# Patient Record
Sex: MALE | Race: BLACK OR AFRICAN AMERICAN | NOT HISPANIC OR LATINO | Employment: OTHER | URBAN - NONMETROPOLITAN AREA
[De-identification: names, ages, dates, MRNs, and addresses within clinical notes are randomized per-mention and may not be internally consistent; named-entity substitution may affect disease eponyms.]

---

## 2023-11-16 ENCOUNTER — HOSPITAL ENCOUNTER (OUTPATIENT)
Facility: HOSPITAL | Age: 28
Discharge: HOME | End: 2023-11-19
Admitting: FAMILY MEDICINE
Payer: MEDICAID

## 2023-11-16 DIAGNOSIS — K52.9 ENTERITIS: ICD-10-CM

## 2023-11-16 DIAGNOSIS — R10.9 ABDOMINAL PAIN, UNSPECIFIED ABDOMINAL LOCATION: ICD-10-CM

## 2023-11-16 DIAGNOSIS — R10.31 RIGHT LOWER QUADRANT ABDOMINAL PAIN: Primary | ICD-10-CM

## 2023-11-16 LAB
ALBUMIN SERPL-MCNC: 4.9 G/DL (ref 3.4–5)
ALBUMIN/GLOB SERPL: 1.8 RATIO
ALP SERPL-CCNC: 96 UNIT/L (ref 50–144)
ALT SERPL-CCNC: 24 UNIT/L (ref 1–45)
ANION GAP SERPL CALC-SCNC: 10 MEQ/L (ref 2–13)
APPEARANCE UR: CLEAR
AST SERPL-CCNC: 35 UNIT/L (ref 17–59)
BASOPHILS # BLD AUTO: 0.04 X10(3)/MCL (ref 0.01–0.08)
BASOPHILS NFR BLD AUTO: 0.6 % (ref 0.1–1.2)
BILIRUB SERPL-MCNC: 1.1 MG/DL (ref 0–1)
BILIRUB UR QL STRIP.AUTO: NEGATIVE
BUN SERPL-MCNC: 8 MG/DL (ref 7–20)
CALCIUM SERPL-MCNC: 9.4 MG/DL (ref 8.4–10.2)
CHLORIDE SERPL-SCNC: 101 MMOL/L (ref 98–110)
CO2 SERPL-SCNC: 27 MMOL/L (ref 21–32)
COLOR UR AUTO: YELLOW
CREAT SERPL-MCNC: 0.63 MG/DL (ref 0.66–1.25)
CREAT/UREA NIT SERPL: 13 (ref 12–20)
EOSINOPHIL # BLD AUTO: 0.19 X10(3)/MCL (ref 0.04–0.54)
EOSINOPHIL NFR BLD AUTO: 3 % (ref 0.7–7)
ERYTHROCYTE [DISTWIDTH] IN BLOOD BY AUTOMATED COUNT: 13.2 %
GFR SERPLBLD CREATININE-BSD FMLA CKD-EPI: >90 MLS/MIN/1.73/M2
GLOBULIN SER-MCNC: 2.7 GM/DL (ref 2–3.9)
GLUCOSE SERPL-MCNC: 114 MG/DL (ref 70–115)
GLUCOSE UR QL STRIP.AUTO: NEGATIVE
HCT VFR BLD AUTO: 44.2 % (ref 36–52)
HGB BLD-MCNC: 15.1 G/DL (ref 13–18)
IMM GRANULOCYTES # BLD AUTO: 0.03 X10(3)/MCL (ref 0–0.03)
IMM GRANULOCYTES NFR BLD AUTO: 0.5 % (ref 0–0.5)
KETONES UR QL STRIP.AUTO: NEGATIVE
LEUKOCYTE ESTERASE UR QL STRIP.AUTO: NEGATIVE
LIPASE SERPL-CCNC: 60 U/L (ref 23–300)
LYMPHOCYTES # BLD AUTO: 2.11 X10(3)/MCL (ref 1.32–3.57)
LYMPHOCYTES NFR BLD AUTO: 33.4 % (ref 20–55)
MCH RBC QN AUTO: 28 PG (ref 27–34)
MCHC RBC AUTO-ENTMCNC: 34.2 G/DL (ref 31–37)
MCV RBC AUTO: 81.9 FL (ref 79–99)
MONOCYTES # BLD AUTO: 0.43 X10(3)/MCL (ref 0.3–0.82)
MONOCYTES NFR BLD AUTO: 6.8 % (ref 4.7–12.5)
NEUTROPHILS # BLD AUTO: 3.51 X10(3)/MCL (ref 1.78–5.38)
NEUTROPHILS NFR BLD AUTO: 55.7 % (ref 37–73)
NITRITE UR QL STRIP.AUTO: NEGATIVE
NRBC BLD AUTO-RTO: 0 %
PH UR STRIP.AUTO: 6 [PH]
PLATELET # BLD AUTO: 315 X10(3)/MCL (ref 140–371)
PMV BLD AUTO: 8.8 FL (ref 9.4–12.4)
POTASSIUM SERPL-SCNC: 3.6 MMOL/L (ref 3.5–5.1)
PROT SERPL-MCNC: 7.6 GM/DL (ref 6.3–8.2)
PROT UR QL STRIP.AUTO: NEGATIVE
RBC # BLD AUTO: 5.4 X10(6)/MCL (ref 4–6)
RBC UR QL AUTO: NEGATIVE
SODIUM SERPL-SCNC: 138 MMOL/L (ref 135–145)
SP GR UR STRIP.AUTO: <=1.005 (ref 1–1.03)
UROBILINOGEN UR STRIP-ACNC: 0.2
WBC # SPEC AUTO: 6.31 X10(3)/MCL (ref 4–11.5)

## 2023-11-16 PROCEDURE — 25000003 PHARM REV CODE 250

## 2023-11-16 PROCEDURE — 25500020 PHARM REV CODE 255

## 2023-11-16 PROCEDURE — 80053 COMPREHEN METABOLIC PANEL: CPT

## 2023-11-16 PROCEDURE — 81003 URINALYSIS AUTO W/O SCOPE: CPT

## 2023-11-16 PROCEDURE — 99285 EMERGENCY DEPT VISIT HI MDM: CPT | Mod: 25

## 2023-11-16 PROCEDURE — 85025 COMPLETE CBC W/AUTO DIFF WBC: CPT

## 2023-11-16 PROCEDURE — 63600175 PHARM REV CODE 636 W HCPCS

## 2023-11-16 PROCEDURE — G0378 HOSPITAL OBSERVATION PER HR: HCPCS

## 2023-11-16 PROCEDURE — 96375 TX/PRO/DX INJ NEW DRUG ADDON: CPT

## 2023-11-16 PROCEDURE — 83690 ASSAY OF LIPASE: CPT

## 2023-11-16 PROCEDURE — S5010 5% DEXTROSE AND 0.45% SALINE: HCPCS

## 2023-11-16 PROCEDURE — 96365 THER/PROPH/DIAG IV INF INIT: CPT | Mod: 59

## 2023-11-16 PROCEDURE — 96361 HYDRATE IV INFUSION ADD-ON: CPT

## 2023-11-16 RX ORDER — FAMOTIDINE 10 MG/ML
20 INJECTION INTRAVENOUS EVERY 12 HOURS
Status: DISCONTINUED | OUTPATIENT
Start: 2023-11-16 | End: 2023-11-19 | Stop reason: HOSPADM

## 2023-11-16 RX ORDER — CITALOPRAM 20 MG/1
20 TABLET, FILM COATED ORAL DAILY
Status: DISCONTINUED | OUTPATIENT
Start: 2023-11-17 | End: 2023-11-19 | Stop reason: HOSPADM

## 2023-11-16 RX ORDER — SODIUM CHLORIDE 0.9 % (FLUSH) 0.9 %
10 SYRINGE (ML) INJECTION
Status: DISCONTINUED | OUTPATIENT
Start: 2023-11-16 | End: 2023-11-19 | Stop reason: HOSPADM

## 2023-11-16 RX ORDER — DEXTROAMPHETAMINE SACCHARATE, AMPHETAMINE ASPARTATE, DEXTROAMPHETAMINE SULFATE, AND AMPHETAMINE SULFATE 7.5; 7.5; 7.5; 7.5 MG/1; MG/1; MG/1; MG/1
1 TABLET ORAL 2 TIMES DAILY
COMMUNITY
Start: 2023-10-31

## 2023-11-16 RX ORDER — KETOROLAC TROMETHAMINE 30 MG/ML
15 INJECTION, SOLUTION INTRAMUSCULAR; INTRAVENOUS EVERY 6 HOURS PRN
Status: DISCONTINUED | OUTPATIENT
Start: 2023-11-16 | End: 2023-11-19 | Stop reason: HOSPADM

## 2023-11-16 RX ORDER — KETOROLAC TROMETHAMINE 30 MG/ML
15 INJECTION, SOLUTION INTRAMUSCULAR; INTRAVENOUS
Status: COMPLETED | OUTPATIENT
Start: 2023-11-16 | End: 2023-11-16

## 2023-11-16 RX ORDER — DEXTROSE MONOHYDRATE AND SODIUM CHLORIDE 5; .45 G/100ML; G/100ML
INJECTION, SOLUTION INTRAVENOUS CONTINUOUS
Status: DISCONTINUED | OUTPATIENT
Start: 2023-11-16 | End: 2023-11-19 | Stop reason: HOSPADM

## 2023-11-16 RX ORDER — CITALOPRAM HYDROBROMIDE 20 MG
20 TABLET ORAL
COMMUNITY
Start: 2023-10-31

## 2023-11-16 RX ORDER — TALC
6 POWDER (GRAM) TOPICAL NIGHTLY PRN
Status: DISCONTINUED | OUTPATIENT
Start: 2023-11-16 | End: 2023-11-19 | Stop reason: HOSPADM

## 2023-11-16 RX ORDER — ONDANSETRON 2 MG/ML
4 INJECTION INTRAMUSCULAR; INTRAVENOUS
Status: COMPLETED | OUTPATIENT
Start: 2023-11-16 | End: 2023-11-16

## 2023-11-16 RX ADMIN — ONDANSETRON 4 MG: 2 INJECTION INTRAMUSCULAR; INTRAVENOUS at 06:11

## 2023-11-16 RX ADMIN — IOPAMIDOL 100 ML: 755 INJECTION, SOLUTION INTRAVENOUS at 06:11

## 2023-11-16 RX ADMIN — FAMOTIDINE 20 MG: 10 INJECTION, SOLUTION INTRAVENOUS at 10:11

## 2023-11-16 RX ADMIN — PIPERACILLIN AND TAZOBACTAM 4.5 G: 4; .5 INJECTION, POWDER, LYOPHILIZED, FOR SOLUTION INTRAVENOUS; PARENTERAL at 08:11

## 2023-11-16 RX ADMIN — KETOROLAC TROMETHAMINE 15 MG: 30 INJECTION, SOLUTION INTRAMUSCULAR; INTRAVENOUS at 06:11

## 2023-11-16 RX ADMIN — DEXTROSE AND SODIUM CHLORIDE: 5; 450 INJECTION, SOLUTION INTRAVENOUS at 10:11

## 2023-11-16 RX ADMIN — MELATONIN TAB 3 MG 6 MG: 3 TAB at 11:11

## 2023-11-17 LAB
ALBUMIN SERPL-MCNC: 4.2 G/DL (ref 3.4–5)
ALBUMIN/GLOB SERPL: 1.9 RATIO
ALP SERPL-CCNC: 83 UNIT/L (ref 50–144)
ALT SERPL-CCNC: 20 UNIT/L (ref 1–45)
ANION GAP SERPL CALC-SCNC: 8 MEQ/L (ref 2–13)
AST SERPL-CCNC: 30 UNIT/L (ref 17–59)
BASOPHILS # BLD AUTO: 0.06 X10(3)/MCL (ref 0.01–0.08)
BASOPHILS NFR BLD AUTO: 0.8 % (ref 0.1–1.2)
BILIRUB SERPL-MCNC: 1 MG/DL (ref 0–1)
BUN SERPL-MCNC: 7 MG/DL (ref 7–20)
CALCIUM SERPL-MCNC: 9.1 MG/DL (ref 8.4–10.2)
CHLORIDE SERPL-SCNC: 102 MMOL/L (ref 98–110)
CO2 SERPL-SCNC: 27 MMOL/L (ref 21–32)
CREAT SERPL-MCNC: 0.67 MG/DL (ref 0.66–1.25)
CREAT/UREA NIT SERPL: 10 (ref 12–20)
EOSINOPHIL # BLD AUTO: 0.31 X10(3)/MCL (ref 0.04–0.54)
EOSINOPHIL NFR BLD AUTO: 4 % (ref 0.7–7)
ERYTHROCYTE [DISTWIDTH] IN BLOOD BY AUTOMATED COUNT: 13.2 %
GFR SERPLBLD CREATININE-BSD FMLA CKD-EPI: >90 MLS/MIN/1.73/M2
GLOBULIN SER-MCNC: 2.2 GM/DL (ref 2–3.9)
GLUCOSE SERPL-MCNC: 93 MG/DL (ref 70–115)
HCT VFR BLD AUTO: 40 % (ref 36–52)
HGB BLD-MCNC: 13.8 G/DL (ref 13–18)
IMM GRANULOCYTES # BLD AUTO: 0.02 X10(3)/MCL (ref 0–0.03)
IMM GRANULOCYTES NFR BLD AUTO: 0.3 % (ref 0–0.5)
LYMPHOCYTES # BLD AUTO: 3.2 X10(3)/MCL (ref 1.32–3.57)
LYMPHOCYTES NFR BLD AUTO: 41.3 % (ref 20–55)
MCH RBC QN AUTO: 28.5 PG (ref 27–34)
MCHC RBC AUTO-ENTMCNC: 34.5 G/DL (ref 31–37)
MCV RBC AUTO: 82.6 FL (ref 79–99)
MONOCYTES # BLD AUTO: 0.5 X10(3)/MCL (ref 0.3–0.82)
MONOCYTES NFR BLD AUTO: 6.5 % (ref 4.7–12.5)
NEUTROPHILS # BLD AUTO: 3.65 X10(3)/MCL (ref 1.78–5.38)
NEUTROPHILS NFR BLD AUTO: 47.1 % (ref 37–73)
NRBC BLD AUTO-RTO: 0 %
PLATELET # BLD AUTO: 306 X10(3)/MCL (ref 140–371)
PMV BLD AUTO: 8.9 FL (ref 9.4–12.4)
POTASSIUM SERPL-SCNC: 4.1 MMOL/L (ref 3.5–5.1)
PROT SERPL-MCNC: 6.4 GM/DL (ref 6.3–8.2)
RBC # BLD AUTO: 4.84 X10(6)/MCL (ref 4–6)
SODIUM SERPL-SCNC: 137 MMOL/L (ref 135–145)
WBC # SPEC AUTO: 7.74 X10(3)/MCL (ref 4–11.5)

## 2023-11-17 PROCEDURE — 96376 TX/PRO/DX INJ SAME DRUG ADON: CPT | Mod: 59

## 2023-11-17 PROCEDURE — S5010 5% DEXTROSE AND 0.45% SALINE: HCPCS

## 2023-11-17 PROCEDURE — 36415 COLL VENOUS BLD VENIPUNCTURE: CPT

## 2023-11-17 PROCEDURE — 85025 COMPLETE CBC W/AUTO DIFF WBC: CPT

## 2023-11-17 PROCEDURE — 96366 THER/PROPH/DIAG IV INF ADDON: CPT

## 2023-11-17 PROCEDURE — 63600175 PHARM REV CODE 636 W HCPCS: Performed by: FAMILY MEDICINE

## 2023-11-17 PROCEDURE — 63600175 PHARM REV CODE 636 W HCPCS

## 2023-11-17 PROCEDURE — 96361 HYDRATE IV INFUSION ADD-ON: CPT

## 2023-11-17 PROCEDURE — G0378 HOSPITAL OBSERVATION PER HR: HCPCS

## 2023-11-17 PROCEDURE — 25000003 PHARM REV CODE 250

## 2023-11-17 PROCEDURE — 94761 N-INVAS EAR/PLS OXIMETRY MLT: CPT

## 2023-11-17 PROCEDURE — 80053 COMPREHEN METABOLIC PANEL: CPT | Performed by: FAMILY MEDICINE

## 2023-11-17 RX ORDER — SINCALIDE 5 UG/5ML
0.02 INJECTION, POWDER, LYOPHILIZED, FOR SOLUTION INTRAVENOUS ONCE
Status: COMPLETED | OUTPATIENT
Start: 2023-11-17 | End: 2023-11-17

## 2023-11-17 RX ORDER — DEXTROAMPHETAMINE SACCHARATE, AMPHETAMINE ASPARTATE, DEXTROAMPHETAMINE SULFATE AND AMPHETAMINE SULFATE 7.5; 7.5; 7.5; 7.5 MG/1; MG/1; MG/1; MG/1
1 TABLET ORAL 2 TIMES DAILY
Status: DISCONTINUED | OUTPATIENT
Start: 2023-11-17 | End: 2023-11-19 | Stop reason: HOSPADM

## 2023-11-17 RX ORDER — DEXTROAMPHETAMINE SACCHARATE, AMPHETAMINE ASPARTATE, DEXTROAMPHETAMINE SULFATE AND AMPHETAMINE SULFATE 7.5; 7.5; 7.5; 7.5 MG/1; MG/1; MG/1; MG/1
1 TABLET ORAL 2 TIMES DAILY
Status: DISCONTINUED | OUTPATIENT
Start: 2023-11-17 | End: 2023-11-17

## 2023-11-17 RX ADMIN — SINCALIDE 1.3 MCG: 5 INJECTION, POWDER, LYOPHILIZED, FOR SOLUTION INTRAVENOUS at 02:11

## 2023-11-17 RX ADMIN — PIPERACILLIN AND TAZOBACTAM 4.5 G: 4; .5 INJECTION, POWDER, LYOPHILIZED, FOR SOLUTION INTRAVENOUS; PARENTERAL at 09:11

## 2023-11-17 RX ADMIN — PIPERACILLIN AND TAZOBACTAM 4.5 G: 4; .5 INJECTION, POWDER, LYOPHILIZED, FOR SOLUTION INTRAVENOUS; PARENTERAL at 03:11

## 2023-11-17 RX ADMIN — CITALOPRAM HYDROBROMIDE 20 MG: 20 TABLET ORAL at 09:11

## 2023-11-17 RX ADMIN — DEXTROSE AND SODIUM CHLORIDE: 5; 450 INJECTION, SOLUTION INTRAVENOUS at 06:11

## 2023-11-17 RX ADMIN — FAMOTIDINE 20 MG: 10 INJECTION, SOLUTION INTRAVENOUS at 09:11

## 2023-11-17 RX ADMIN — DEXTROAMPHETAMINE SACCHARATE, AMPHETAMINE ASPARTATE, DEXTROAMPHETAMINE SULFATE AND AMPHETAMINE SULFATE 30 MG: 7.5; 7.5; 7.5; 7.5 TABLET ORAL at 09:11

## 2023-11-17 RX ADMIN — DEXTROSE AND SODIUM CHLORIDE: 5; 450 INJECTION, SOLUTION INTRAVENOUS at 11:11

## 2023-11-17 RX ADMIN — PIPERACILLIN AND TAZOBACTAM 4.5 G: 4; .5 INJECTION, POWDER, LYOPHILIZED, FOR SOLUTION INTRAVENOUS; PARENTERAL at 11:11

## 2023-11-17 RX ADMIN — DEXTROSE AND SODIUM CHLORIDE: 5; 450 INJECTION, SOLUTION INTRAVENOUS at 03:11

## 2023-11-17 RX ADMIN — KETOROLAC TROMETHAMINE 15 MG: 30 INJECTION, SOLUTION INTRAMUSCULAR; INTRAVENOUS at 06:11

## 2023-11-17 NOTE — PLAN OF CARE
11/17/23 0945   Discharge Assessment   Assessment Type Discharge Planning Assessment   Confirmed/corrected address, phone number and insurance Yes   Confirmed Demographics Correct on Facesheet   Source of Information patient   When was your last doctors appointment? 10/04/23   Communicated DELORES with patient/caregiver Date not available/Unable to determine   Reason For Admission Enteritis   People in Home parent(s);child(ace), dependent   Facility Arrived From: Home   Do you expect to return to your current living situation? Yes   Prior to hospitilization cognitive status: Alert/Oriented   Current cognitive status: Alert/Oriented   Equipment Currently Used at Home none   Readmission within 30 days? No   Do you currently have service(s) that help you manage your care at home? No   Do you take prescription medications? Yes   Do you have prescription coverage? Yes   [place]age [place]   Do you have any problems affording any of your prescribed medications? No   Is the patient taking medications as prescribed? yes   Who is going to help you get home at discharge? Parent   How do you get to doctors appointments? family or friend will provide   Are you on dialysis? No   Do you take coumadin? No   DME Needed Upon Discharge  none   Discharge Plan discussed with: Parent(s)   Name(s) and Number(s) Dony Ling-Father  [phone]   Transition of Care Barriers None   Discharge Plan A Home with family   Physical Activity   On average, how many days per week do you engage in moderate to strenuous exercise (like a brisk walk)? 1 day   On average, how many minutes do you engage in exercise at this level? 10 min   Financial Resource Strain   How hard is it for you to pay for the very basics like food, housing, medical care, and heating? Not hard   Housing Stability   In the last 12 months, was there a time when you were not able to pay the mortgage or rent on time? N   In the last 12 months, how many places have you lived? 1   In the  last 12 months, was there a time when you did not have a steady place to sleep or slept in a shelter (including now)? N   Transportation Needs   In the past 12 months, has lack of transportation kept you from medical appointments or from getting medications? no   In the past 12 months, has lack of transportation kept you from meetings, work, or from getting things needed for daily living? No   Food Insecurity   Within the past 12 months, you worried that your food would run out before you got the money to buy more. Never true   Within the past 12 months, the food you bought just didn't last and you didn't have money to get more. Never true   Stress   Do you feel stress - tense, restless, nervous, or anxious, or unable to sleep at night because your mind is troubled all the time - these days? To some exte   Social Connections   In a typical week, how many times do you talk on the phone with family, friends, or neighbors? More than 3   How often do you get together with friends or relatives? More than 3   How often do you attend Rastafari or Restorationist services? 1 to 4   Do you belong to any clubs or organizations such as Rastafari groups, unions, fraternal or athletic groups, or school groups? No   How often do you attend meetings of the clubs or organizations you belong to? Never   Are you , , , , never , or living with a partner? Never marrie   Alcohol Use   Q1: How often do you have a drink containing alcohol? Never   Q2: How many drinks containing alcohol do you have on a typical day when you are drinking? None   Q3: How often do you have six or more drinks on one occasion? Never   OTHER   Name(s) of People in Home Dony Ling, Ibeth Gee, and 16 yr sister

## 2023-11-17 NOTE — PROGRESS NOTES
[facility]/Surg  [facility] Medicine  Progress Note    Patient Name: Manny Ling  MRN: [id number]  Patient Class: OP- Observation   Admission Date: 11/16/2023  Length of Stay: 0 days  Attending Physician: Jay Howard MD  Primary Care Provider: No primary care provider on file.        Subjective:     Principal Problem:Right lower quadrant abdominal pain        HPI:   Abdominal Pain       RLQ abd/periumbilical pain. That started today. With nausea. Last BM today PTA. reported to be normal. Denies Fever, Dysuria.          HPI: The patient is a 28-year-old man with no significant past medical history. He presents with a magalie pain present for the last one to two days. Very little history is available from the patient due to history of autism. The patient's cat scan performed in the emergency department is significant for right lower quadrant inflammatory process possibly. The appendix is not well visualized soul appendicitis remains on the differential. General surgery was consult it for evaluation and they will see the patient in the morning. Currently he is not complaining of any abdominal pain and his exam is not remarkable.     No past medical history on file.     No past surgical history on file.     Review of patient's allergies indicates:  No Known Allergies     No current facility-administered medications on file prior to encounter.       Overview/Hospital Course:  11/17/2023 pt 29 yo with h/o autism presented with RLQ and belem-umbilical pain.  Surgery consulted.      Interval History:      Review of Systems   Unable to perform ROS: Other   Constitutional:  Negative for appetite change, fatigue and fever.   Respiratory:  Negative for cough, shortness of breath and wheezing.    Cardiovascular:  Negative for chest pain and leg swelling.   Gastrointestinal:  Negative for abdominal distention, abdominal pain, constipation, diarrhea, nausea and vomiting.   Skin:  Negative for color change,  pallor, rash and wound.     Objective:     Vital Signs (Most Recent):  Temp: 98 °F (36.7 °C) (11/17/23 1029)  Pulse: 64 (11/17/23 1029)  Resp: 16 (11/17/23 1029)  BP: 136/89 (11/17/23 1029)  SpO2: 95 % (11/17/23 1029) Vital Signs (24h Range):  Temp:  [97.5 °F (36.4 °C)-98.6 °F (37 °C)] 98 °F (36.7 °C)  Pulse:  [60-91] 64  Resp:  [14-20] 16  SpO2:  [95 %-99 %] 95 %  BP: (128-141)/(82-98) 136/89     Weight: 66.3 kg (146 lb 2.6 oz)  Body mass index is 22.22 kg/m².    Intake/Output Summary (Last 24 hours) at 11/17/2023 1322  Last data filed at 11/17/2023 0623  Gross per 24 hour   Intake 1063.33 ml   Output --   Net 1063.33 ml         Physical Exam  Constitutional:       General: He is not in acute distress.     Appearance: Normal appearance. He is normal weight. He is not ill-appearing.   HENT:      Head: Normocephalic and atraumatic.      Nose: Nose normal.   Eyes:      General: No scleral icterus.     Conjunctiva/sclera: Conjunctivae normal.   Cardiovascular:      Rate and Rhythm: Normal rate and regular rhythm.      Pulses: Normal pulses.      Heart sounds: Normal heart sounds.   Pulmonary:      Effort: Pulmonary effort is normal.      Breath sounds: Normal breath sounds.   Abdominal:      General: Abdomen is flat. Bowel sounds are normal.      Palpations: Abdomen is soft.      Tenderness: There is abdominal tenderness (belem umbilical and RLQ mild tenderenss).   Skin:     General: Skin is warm and dry.      Findings: No erythema or rash.   Neurological:      General: No focal deficit present.      Mental Status: He is alert and oriented to person, place, and time.   Psychiatric:         Mood and Affect: Mood normal.         Behavior: Behavior normal.         Thought Content: Thought content normal.             Significant Labs: All pertinent labs within the past 24 hours have been reviewed.  BMP:   Recent Labs   Lab 11/17/23  0436      K 4.1   CO2 27   BUN 7.0   CREATININE 0.67   CALCIUM 9.1     CBC:   Recent  Labs   Lab 11/16/23  1822 11/17/23  0436   WBC 6.31 7.74   HGB 15.1 13.8   HCT 44.2 40.0    306       Significant Imaging: I have reviewed all pertinent imaging results/findings within the past 24 hours.    Assessment/Plan:      * Right lower quadrant abdominal pain  Rule out appendicitis, no clear evidence on CT  To have US this am  Surgery constulted  Will continue iv abx      Enteritis  CT shows area of enteritis small bowel  US pedning  Get HIDA scan  Continue iv abx        VTE Risk Mitigation (From admission, onward)           Ordered     IP VTE LOW RISK PATIENT  Once         11/16/23 2109     Place sequential compression device  Until discontinued         11/16/23 2109                    Discharge Planning   DELORES: 11/18/2023     Code Status: Full Code   Is the patient medically ready for discharge?:     Reason for patient still in hospital (select all that apply): Patient new problem, Laboratory test, Treatment, Imaging, and Consult recommendations  Discharge Plan A: Home with family                  Maria Isabel Agarwal MD  Department of Hospital Medicine   [facility]-Med/Surg

## 2023-11-17 NOTE — H&P (VIEW-ONLY)
"The patient is a 28 yr old man with no significant past medical history He presents with a magalie pain present for the last one to two days.  Very little history is available from the patient due to history of autism.  The patient's CT performed in the ER is significant for right lower quadrant inflammatory process possibly. The appendix is not well visualized soul appendicitis remains on the differential. General Surgery was consult it for evaluation and they will see the patient in the morning. Currently he is not complaining of any abdominal pain.     No known Allergies     Past Medical History   Attention Deficit Disorder  Anxiety   Autistic     Past Surgical History  Tonsillectomy - > 5 yrs - When he was a child  No EGD  No Stress test  No Echocardiogram  No Angiogram   No Colonoscopy      Immunizations  DTAP on 06/22/2001  Hepatitis on 09/30/1998  No Shingles vaccine  Flu on 11/28/2018  No Pneumonia vaccine  No Covid 19 vaccine   Varicella on 07/27/2009    Family History   Mother is alive and working as  @ [employer].  and Divorce   2.   Father is alive and working as a   3.   MGM - DM, CAD, MI X Multiple   4.   MGF - DM, CAD, MI    Social History   Non Smoker  Non Alcohol  No Drugs  No  Service  No senior care time  No Rehab   Not , No Children  Education - 12 th grade - 1 semester college  Disabled -23 yrs.- Autistic  Resides in [place]  PCP is Dr Earl Patel    Review of Systems   Unobtainable 2/2 pt baseline Mental Status     Objective   Vital Signs (11/16/2023)  BP is 134/86  Pulse is 90  Temp is 98.6F(37C)  Resp is 17  Spo2 is 99%  Height is 5' 8" (1.727 M)  Weight is 67.1 kg(148 lb)  Body Mass Index is 22.5kg/m2    Physical Exam  Patient is alert, cooperative,  no distress, appears stated age.  Head is atraumatic  Pupils are round, and equal and reactive to light and is positive for glasses and contacts.   Neck is supple with normal range of motion  Clear to " auscultation bilaterally, respirations unlabored   Regular rate and rhythm, S1 and S2 normal, no murmur, rub or gallop  Abdomen is soft, non tender, bowel sounds active all four quadrants, no masses, or organomegaly.  Extremities normal, atraumatic, no cyanosis or edema   Pulses are 2+ and symmetric   Skin color and texture, turgor normal    Lab Findings  CBC(11/17/2023)  6.31>     15.1/44.2   <315    81.9/28.0   CMP   138/3.6     101/27       8.0 /0.63       <114      9.4/       CT Abdomen and Pelvis With IV Contrast  Impression    1. I suspect a mild apparent inflammatory/infectious process involving the small bowel and mesentery of the pelvis and there is indistinctness of the vas deferens, the prostate with at least mild thickening the urinary bladder wall.  No free air free fluid is identified and the patient is not noted to have an elevated white blood cell count which argues against most of the above with perhaps the exception of an inflammatory bowel disease.  These questionable findings were communicated to the emergency room physician at 19:30.  Recommend clinical correlation       Ultrasound Abdomen Limited  Impression  Not signed, no impression. Will continue to update information        Assessment   The patient is a 28 yr old man with no significant past medical history He presents with a magalie pain present for the last one to two days.  Very little history is available from the patient due to history of autism.  The patient's CT performed in the ER is significant for right lower quadrant inflammatory process possibly. The appendix is not well visualized soul appendicitis remains on the differential. General Surgery was consult it for evaluation and they will see the patient in the morning. Currently he is not complaining of any abdominal pain.       Plan  I.  IV Fluids   2. Check Covid 19  3. Check Ultra Sound  4. Check Hida Scan  5. Recheck CT Scan in AM  6. Check Stool for PCR

## 2023-11-17 NOTE — SUBJECTIVE & OBJECTIVE
Interval History:      Review of Systems   Unable to perform ROS: Other   Constitutional:  Negative for appetite change, fatigue and fever.   Respiratory:  Negative for cough, shortness of breath and wheezing.    Cardiovascular:  Negative for chest pain and leg swelling.   Gastrointestinal:  Negative for abdominal distention, abdominal pain, constipation, diarrhea, nausea and vomiting.   Skin:  Negative for color change, pallor, rash and wound.     Objective:     Vital Signs (Most Recent):  Temp: 98 °F (36.7 °C) (11/17/23 1029)  Pulse: 64 (11/17/23 1029)  Resp: 16 (11/17/23 1029)  BP: 136/89 (11/17/23 1029)  SpO2: 95 % (11/17/23 1029) Vital Signs (24h Range):  Temp:  [97.5 °F (36.4 °C)-98.6 °F (37 °C)] 98 °F (36.7 °C)  Pulse:  [60-91] 64  Resp:  [14-20] 16  SpO2:  [95 %-99 %] 95 %  BP: (128-141)/(82-98) 136/89     Weight: 66.3 kg (146 lb 2.6 oz)  Body mass index is 22.22 kg/m².    Intake/Output Summary (Last 24 hours) at 11/17/2023 1322  Last data filed at 11/17/2023 0623  Gross per 24 hour   Intake 1063.33 ml   Output --   Net 1063.33 ml         Physical Exam  Constitutional:       General: He is not in acute distress.     Appearance: Normal appearance. He is normal weight. He is not ill-appearing.   HENT:      Head: Normocephalic and atraumatic.      Nose: Nose normal.   Eyes:      General: No scleral icterus.     Conjunctiva/sclera: Conjunctivae normal.   Cardiovascular:      Rate and Rhythm: Normal rate and regular rhythm.      Pulses: Normal pulses.      Heart sounds: Normal heart sounds.   Pulmonary:      Effort: Pulmonary effort is normal.      Breath sounds: Normal breath sounds.   Abdominal:      General: Abdomen is flat. Bowel sounds are normal.      Palpations: Abdomen is soft.      Tenderness: There is abdominal tenderness (belem umbilical and RLQ mild tenderenss).   Skin:     General: Skin is warm and dry.      Findings: No erythema or rash.   Neurological:      General: No focal deficit present.       Mental Status: He is alert and oriented to person, place, and time.   Psychiatric:         Mood and Affect: Mood normal.         Behavior: Behavior normal.         Thought Content: Thought content normal.             Significant Labs: All pertinent labs within the past 24 hours have been reviewed.  BMP:   Recent Labs   Lab 11/17/23  0436      K 4.1   CO2 27   BUN 7.0   CREATININE 0.67   CALCIUM 9.1     CBC:   Recent Labs   Lab 11/16/23  1822 11/17/23  0436   WBC 6.31 7.74   HGB 15.1 13.8   HCT 44.2 40.0    306       Significant Imaging: I have reviewed all pertinent imaging results/findings within the past 24 hours.

## 2023-11-17 NOTE — ED PROVIDER NOTES
Encounter Date: 11/16/2023       History     Chief Complaint   Patient presents with    Abdominal Pain     RLQ abd/periumbilical pain. That started today. With nausea. Last BM today PTA. reported to be normal. Denies Fever, Dysuria.      28-year-old male presents complaining of waxing-waning abdominal pain, that began today.  The pain is located to the right and slightly above the umbilicus.  He had an episode of severe pain with a diaphoresis earlier.  The pain is not so bad now.  He denies any constipation.  He denies any urinary problems.  He does not have a history of stomach problems.  He has autism, and is accompanied by his father.    The history is provided by the patient and a parent.     Review of patient's allergies indicates:  No Known Allergies  No past medical history on file.  No past surgical history on file.  No family history on file.     Review of Systems   Constitutional:  Negative for fever.   HENT:  Negative for sore throat.    Respiratory:  Negative for shortness of breath.    Cardiovascular:  Negative for chest pain.   Gastrointestinal:  Positive for abdominal pain and nausea. Negative for constipation, diarrhea and vomiting.   Genitourinary:  Negative for dysuria.   Musculoskeletal:  Negative for back pain.   Skin:  Negative for rash.   Neurological:  Negative for weakness.   Hematological:  Does not bruise/bleed easily.   All other systems reviewed and are negative.      Physical Exam     Initial Vitals [11/16/23 1806]   BP Pulse Resp Temp SpO2   134/86 90 17 98.6 °F (37 °C) 99 %      MAP       --         Physical Exam    Nursing note and vitals reviewed.  Constitutional: Vital signs are normal. He appears well-developed and well-nourished. He is cooperative.   He appears well   HENT:   Head: Normocephalic and atraumatic.   Mouth/Throat: Oropharynx is clear and moist.   Eyes: Conjunctivae, EOM and lids are normal. Pupils are equal, round, and reactive to light.   Neck: Trachea normal. Neck  supple.   Normal range of motion.  Cardiovascular:  Normal rate, regular rhythm, normal heart sounds and intact distal pulses.           Pulmonary/Chest: Breath sounds normal.   Abdominal: Abdomen is soft. Bowel sounds are normal. He exhibits no distension. There is no abdominal tenderness. There is no rebound and no guarding.   Musculoskeletal:         General: Normal range of motion.      Cervical back: Normal, normal range of motion and neck supple.      Lumbar back: Normal.     Neurological: He is alert and oriented to person, place, and time. He has normal strength. Coordination normal. GCS score is 15. GCS eye subscore is 4. GCS verbal subscore is 5. GCS motor subscore is 6.   Skin: Skin is warm, dry and intact. Capillary refill takes less than 2 seconds.   Psychiatric: He has a normal mood and affect. His speech is normal and behavior is normal. Judgment and thought content normal. Cognition and memory are normal.         ED Course   Procedures  Labs Reviewed   COMPREHENSIVE METABOLIC PANEL - Abnormal; Notable for the following components:       Result Value    Creatinine 0.63 (*)     Bilirubin Total 1.1 (*)     All other components within normal limits   CBC WITH DIFFERENTIAL - Abnormal; Notable for the following components:    MPV 8.8 (*)     All other components within normal limits   URINALYSIS, REFLEX TO URINE CULTURE - Normal    Narrative:      URINE STABILITY IS 2 HOURS AT ROOM TEMP OR    SIX HOURS REFRIGERATED. PERFORMING TESTING ON    SPECIMENS GREATER THAN THIS AGE MAY AFFECT THE    FOLLOWING TESTS:    PH          SPECIFIC GRAVITY           BLOOD    CLARITY     BILIRUBIN               UROBILINOGEN   LIPASE - Normal   CBC W/ AUTO DIFFERENTIAL    Narrative:     The following orders were created for panel order CBC auto differential.  Procedure                               Abnormality         Status                     ---------                               -----------         ------                      CBC with Differential[[id number]]       Abnormal            Final result                 Please view results for these tests on the individual orders.          Imaging Results              CT Abdomen Pelvis With IV Contrast (Final result)  Result time 11/16/23 19:33:53      Final result by Robert Funes MD (11/16/23 19:33:53)                   Impression:        1. I suspect a mild apparent inflammatory/infectious process involving the small bowel and mesentery of the pelvis and there is indistinctness of the vas deferens, the prostate with at least mild thickening the urinary bladder wall.  No free air free fluid is identified and the patient is not noted to have an elevated white blood cell count which argues against most of the above with perhaps the exception of an inflammatory bowel disease.  These questionable findings were communicated to the emergency room physician at 19:30.  Recommend clinical correlation.    n/a    CATEGORY: n/a    The following dose reduction techniques are used for all CT at [facility]:    1.   Automated exposure control.    2.   Adjustment of the mA and/or kV according to patient size.    3.   Use of iterative reconstruction technique.      Electronically signed by: Robert Funes  Date:    11/16/2023  Time:    19:33               Narrative:    EXAMINATION:  CT ABDOMEN PELVIS WITH IV CONTRAST    CLINICAL HISTORY:  RLQ abdominal pain (Age >= 14y);    TECHNIQUE:  Low dose axial images, sagittal and coronal reformations were obtained from the lung bases to the pubic symphysis following the IV administration of 100 cc of Isovue 370.    COMPARISON:  None.    FINDINGS:  Liver:  No clinically significant abnormalities noted.    Gallbladder/Biliary System:  No clinically significant abnormalities noted.    Spleen:  No clinically significant abnormalities noted.    Adrenal glands:  No clinically significant abnormalities noted.    Pancreas:  No clinically  significant abnormalities noted.    Kidneys/Urinary Tract:  No clinically significant abnormalities noted.    Urinary bladder:  There is apparent thickening of the urinary bladder wall.    Prostate gland/uterus and ovaries:  The prostate appears to be inflamed.    GI tract: I suspect an inflammatory process is present involving the mesentery of the pelvis and the right lower quadrant with slight indistinctness to the area of the seminal vesicles and the prostate, and the urinary bladder wall appears to be thickened.  A curvilinear small air lucency is seen which could represent at least a portion of the appendix which appears normal however I do not believe that the entirety of the appendix is visible.    Vascular structures:  No clinically significant abnormalities noted.    Musculoskeletal structures:  No clinically significant abnormalities noted.    Miscellaneous:  No clinically significant abnormalities noted.                                       Medications   piperacillin-tazobactam (ZOSYN) 4.5 g in dextrose 5 % in water (D5W) 100 mL IVPB (MB+) (4.5 g Intravenous New Bag 11/16/23 2002)   ondansetron injection 4 mg (4 mg Intravenous Given 11/16/23 1825)   ketorolac injection 15 mg (15 mg Intravenous Given 11/16/23 1827)   iopamidoL (ISOVUE-370) injection 100 mL (100 mLs Intravenous Given 11/16/23 1853)     Medical Decision Making  Right-sided abdominal pain.  Intensity is waxing-waning.  Physical exam is unremarkable.  Differential diagnosis:  Appendicitis, diverticulitis, ureterolithiasis, constipation, obstruction  Toradol, Zofran  CT abdomen, labs    Amount and/or Complexity of Data Reviewed  Labs: ordered. Decision-making details documented in ED Course.  Radiology: ordered.  Discussion of management or test interpretation with external provider(s): I discussed the CT findings with Dr. Chacon, the radiologist at length.  I discussed the case with Dr. Shaw, who recommends keeping him overnight, giving  him antibiotics, and re-evaluating him in the morning.    Risk  Prescription drug management.               ED Course as of 11/16/23 2013   u Nov 16, 2023   1832 CBC auto differential(!)  Normal, no leukocytosis [TM]   1850 Comprehensive metabolic panel(!)  Bilirubin ever so slightly elevated, otherwise normal [TM]   1851 Lipase  Normal [TM]      ED Course User Index  [TM] Davion Han MD                        Clinical Impression:  Final diagnoses:  [R10.31] Right lower quadrant abdominal pain (Primary)  [K52.9] Enteritis          ED Disposition Condition    Observation Stable                Davion Han MD  11/16/23 2014

## 2023-11-17 NOTE — ASSESSMENT & PLAN NOTE
Rule out appendicitis, no clear evidence on CT  To have US this am  Surgery constulted  Will continue iv abx

## 2023-11-17 NOTE — HOSPITAL COURSE
11/17/2023 pt 29 yo with h/o autism presented with RLQ and belem-umbilical pain.  Surgery consulted.    11/18/2023 pain is better today.  CT of abd/pel pending this am  11/19/2023 s/p lap jeaneth doing well post op and ready for dc .  BP was elevated immediately post op is 130/80 this am.  Discussed with pt father will monitor BP at home and bring record of BP to f/u appt with PCP ini 1 week.  Pt will f/u with surgery in 1-2 weeks and continue full liquid diet advance slowly to low cholesterol/low fat per surgery f/u recommendations.

## 2023-11-17 NOTE — CONSULTS
"The patient is a 28 yr old man with no significant past medical history He presents with a magalie pain present for the last one to two days.  Very little history is available from the patient due to history of autism.  The patient's CT performed in the ER is significant for right lower quadrant inflammatory process possibly. The appendix is not well visualized soul appendicitis remains on the differential. General Surgery was consult it for evaluation and they will see the patient in the morning. Currently he is not complaining of any abdominal pain.     No known Allergies     Past Medical History   Attention Deficit Disorder  Anxiety   Autistic     Past Surgical History  Tonsillectomy - > 5 yrs - When he was a child  No EGD  No Stress test  No Echocardiogram  No Angiogram   No Colonoscopy      Immunizations  DTAP on 06/22/2001  Hepatitis on 09/30/1998  No Shingles vaccine  Flu on 11/28/2018  No Pneumonia vaccine  No Covid 19 vaccine   Varicella on 07/27/2009    Family History   Mother is alive and working as  @ [employer].  and Divorce   2.   Father is alive and working as a   3.   MGM - DM, CAD, MI X Multiple   4.   MGF - DM, CAD, MI    Social History   Non Smoker  Non Alcohol  No Drugs  No  Service  No long term time  No Rehab   Not , No Children  Education - 12 th grade - 1 semester college  Disabled -23 yrs.- Autistic  Resides in [place]  PCP is Dr Earl Patel    Review of Systems   Unobtainable 2/2 pt baseline Mental Status     Objective   Vital Signs (11/16/2023)  BP is 134/86  Pulse is 90  Temp is 98.6F(37C)  Resp is 17  Spo2 is 99%  Height is 5' 8" (1.727 M)  Weight is 67.1 kg(148 lb)  Body Mass Index is 22.5kg/m2    Physical Exam  Patient is alert, cooperative,  no distress, appears stated age.  Head is atraumatic  Pupils are round, and equal and reactive to light and is positive for glasses and contacts.   Neck is supple with normal range of motion  Clear to " auscultation bilaterally, respirations unlabored   Regular rate and rhythm, S1 and S2 normal, no murmur, rub or gallop  Abdomen is soft, non tender, bowel sounds active all four quadrants, no masses, or organomegaly.  Extremities normal, atraumatic, no cyanosis or edema   Pulses are 2+ and symmetric   Skin color and texture, turgor normal    Lab Findings  CBC(11/17/2023)  6.31>     15.1/44.2   <315    81.9/28.0   CMP   138/3.6     101/27       8.0 /0.63       <114      9.4/       CT Abdomen and Pelvis With IV Contrast  Impression    1. I suspect a mild apparent inflammatory/infectious process involving the small bowel and mesentery of the pelvis and there is indistinctness of the vas deferens, the prostate with at least mild thickening the urinary bladder wall.  No free air free fluid is identified and the patient is not noted to have an elevated white blood cell count which argues against most of the above with perhaps the exception of an inflammatory bowel disease.  These questionable findings were communicated to the emergency room physician at 19:30.  Recommend clinical correlation       Ultrasound Abdomen Limited  Impression  Not signed, no impression. Will continue to update information        Assessment   The patient is a 28 yr old man with no significant past medical history He presents with a magalie pain present for the last one to two days.  Very little history is available from the patient due to history of autism.  The patient's CT performed in the ER is significant for right lower quadrant inflammatory process possibly. The appendix is not well visualized soul appendicitis remains on the differential. General Surgery was consult it for evaluation and they will see the patient in the morning. Currently he is not complaining of any abdominal pain.       Plan  I.  IV Fluids   2. Check Covid 19  3. Check Ultra Sound  4. Check Hida Scan  5. Recheck CT Scan in AM  6. Check Stool for PCR

## 2023-11-17 NOTE — HPI
Abdominal Pain       RLQ abd/periumbilical pain. That started today. With nausea. Last BM today PTA. reported to be normal. Denies Fever, Dysuria.          HPI: The patient is a 28-year-old man with no significant past medical history. He presents with a magalie pain present for the last one to two days. Very little history is available from the patient due to history of autism. The patient's cat scan performed in the emergency department is significant for right lower quadrant inflammatory process possibly. The appendix is not well visualized soul appendicitis remains on the differential. General surgery was consult it for evaluation and they will see the patient in the morning. Currently he is not complaining of any abdominal pain and his exam is not remarkable.     No past medical history on file.     No past surgical history on file.     Review of patient's allergies indicates:  No Known Allergies     No current facility-administered medications on file prior to encounter.

## 2023-11-17 NOTE — H&P
[facility]    [facility] Physical      Patient Name: Manny Ling  MRN: [id number]  Admission Date: 11/16/2023  Attending Physician: Jay Howard MD   Primary Care Provider: No primary care provider on file.         Patient information was obtained from patient and ER records.     Subjective:     Principal Problem:Right lower quadrant abdominal pain    Chief Complaint: Abdominal pain  Chief Complaint   Patient presents with    Abdominal Pain     RLQ abd/periumbilical pain. That started today. With nausea. Last BM today PTA. reported to be normal. Denies Fever, Dysuria.         HPI: The patient is a 28-year-old man with no significant past medical history. He presents with a magalie pain present for the last one to two days. Very little history is available from the patient due to history of autism. The patient's cat scan performed in the emergency department is significant for right lower quadrant inflammatory process possibly. The appendix is not well visualized soul appendicitis remains on the differential. General surgery was consult it for evaluation and they will see the patient in the morning. Currently he is not complaining of any abdominal pain and his exam is not remarkable.    No past medical history on file.    No past surgical history on file.    Review of patient's allergies indicates:  No Known Allergies    No current facility-administered medications on file prior to encounter.     Current Outpatient Medications on File Prior to Encounter   Medication Sig    ADDERALL 30 mg Tab Take 1 tablet by mouth 2 (two) times daily.    CELEXA 20 mg tablet Take 20 mg by mouth.     Family History    None       Tobacco Use    Smoking status: Not on file    Smokeless tobacco: Not on file   Substance and Sexual Activity    Alcohol use: Not on file    Drug use: Not on file    Sexual activity: Not on file     Review of Systems Unobtainable 2/2 pt baseline mental status  Objective:     Vital  "Signs (Most Recent):  Temp: 98.6 °F (37 °C) (11/16/23 1806)  Pulse: 90 (11/16/23 1806)  Resp: 17 (11/16/23 1806)  BP: 134/86 (11/16/23 1806)  SpO2: 99 % (11/16/23 1806) Vital Signs (24h Range):  Temp:  [98.6 °F (37 °C)] 98.6 °F (37 °C)  Pulse:  [90] 90  Resp:  [17] 17  SpO2:  [99 %] 99 %  BP: (134)/(86) 134/86     Weight: 67.1 kg (148 lb)  Body mass index is 22.5 kg/m².    Physical Exam   /86   Pulse 90   Temp 98.6 °F (37 °C) (Oral)   Resp 17   Ht 5' 8" (1.727 m)   Wt 67.1 kg (148 lb)   SpO2 99%   BMI 22.50 kg/m²     General Appearance:  Alert, cooperative, no distress, appears stated age   Head:  Normocephalic, without obvious abnormality, atraumatic   Eyes:  PERRL, conjunctiva/corneas clear, EOM's intact, fundi benign, both eyes   Ears:  Normal TM's and external ear canals, both ears   Nose: Nares normal, septum midline, mucosa normal, no drainage or sinus tenderness   Throat: Lips, mucosa, and tongue normal; teeth and gums normal   Neck: Supple, symmetrical, trachea midline, no adenopathy, thyroid: not enlarged, symmetric, no tenderness/mass/nodules, no carotid bruit or JVD   Back:   Symmetric, no curvature, ROM normal, no CVA tenderness   Lungs:   Clear to auscultation bilaterally, respirations unlabored   Chest Wall:  No tenderness or deformity   Heart:  Regular rate and rhythm, S1, S2 normal, no murmur, rub or gallop   Abdomen:   Soft, non-tender, bowel sounds active all four quadrants,  no masses, no organomegaly   Genitalia:  Normal male   Rectal:  Normal tone, normal prostate, no masses or tenderness;  guaiac negative stool   Extremities: Extremities normal, atraumatic, no cyanosis or edema   Pulses: 2+ and symmetric   Skin: Skin color, texture, turgor normal, no rashes or lesions   Lymph nodes: Cervical, supraclavicular, and axillary nodes normal   Neurologic: Normal         Significant Labs: All pertinent labs within the past 24 hours have been reviewed.  Recent Lab Results         " 11/16/23  1943   11/16/23  1822        Albumin/Globulin Ratio   1.8       Albumin   4.9       ALP   96       ALT   24       Anion Gap   10.0       Appearance, UA Clear         AST   35       Baso #   0.04       Basophil %   0.6       BILIRUBIN TOTAL   1.1       Bilirubin, UA Negative         BUN   8.0       BUN/CREAT RATIO   13       Calcium   9.4       Chloride   101       CO2   27       Color, UA Yellow         Creatinine   0.63       eGFR   >90  Comment:                      EGFR INTERPRETATION    Beginning 8/15/22 we are reporting the eGFRcr calculation as recommended by the National Kidney Foundation. The eGFRcr equation has similar overall performance characteristics to the older equation, but the values may differ by more than 10% particularly at higher values of eGFRcr and younger adult ages.    NKF stages of chronic kidney disease (CKD)  Stage 1: Kidney damage with normal or increased eGFR (>90 mL/min/1.73 m^2)  Stage 2: Mild reduction in GFR (60-89 mL/min/1.73 m^2)  Stage 3a: Moderate reduction in GFR (45-59 mL/min/1.73 m^2)  Stage 3b: Moderate reduction in GFR (30-44 mL/min/1.73 m^2)  Stage 4: Severe reduction in GFR (15-29 mL/min/1.73 m^2)  Stage 5: Kidney failure (GFR <15 mL/min/1.73 m^2)           Eos #   0.19       Eosinophil %   3.0       Globulin, Total   2.7       Glucose   114       Glucose, UA Negative         Hematocrit   44.2       Hemoglobin   15.1       Immature Grans (Abs)   0.03       Immature Granulocytes   0.5       Ketones, UA Negative         Leukocytes, UA Negative         Lipase   60       Lymph #   2.11       LYMPH %   33.4       MCH   28.0       MCHC   34.2       MCV   81.9       Mono #   0.43       Mono %   6.8       MPV   8.8       Neut #   3.51       Neut %   55.7       NITRITE UA Negative         nRBC   0.0       Occult Blood UA Negative         pH, UA 6.0         Platelet Count   315       Potassium   3.6       PROTEIN TOTAL   7.6       Protein, UA Negative         RBC   5.40        RDW   13.2       Sodium   138       Specific Gravity,UA <=1.005         Urobilinogen, UA 0.2         WBC   6.31               Significant Imaging: I have reviewed all pertinent imaging results/findings within the past 24 hours.  I have reviewed and interpreted all pertinent imaging results/findings within the past 24 hours.    Assessment/Plan:     Active Diagnoses:    Diagnosis Date Noted POA    PRINCIPAL PROBLEM:  Right lower quadrant abdominal pain [R10.31] 11/16/2023 Unknown    Enteritis [K52.9] 11/16/2023 Unknown     - Surgical consultation  - Appendicitis remains in differential  - IV abx Zosyn ongoing  - At this time pt is not c/o pain. I do not want to IV narcotic pain medications to cloud the clinical picture so we will continue with toradol PRN.   - IVF, nausea control ongoing    This encounter was completed via telemedicine (audio/video) w/ nursing at bedside ot assist w/ clinical exam.  SOC Audio/Visual Equipment is using HIPPA Compliant Web Platform. The patient is located in the hospital and the provider is located off site. This patient is placed in observation status under my care.       Participants on Call: Bedside RN, Patient, Physician on Call.     Problems Resolved During this Admission:     VTE Risk Mitigation (From admission, onward)           Ordered     IP VTE LOW RISK PATIENT  Once         11/16/23 2109     Place sequential compression device  Until discontinued         11/16/23 2109                      Josue Lozoya MD  Department of Hospital Medicine   [facility]

## 2023-11-17 NOTE — PLAN OF CARE
Problem: Adult Inpatient Plan of Care  Goal: Plan of Care Review  Outcome: Ongoing, Progressing  Goal: Absence of Hospital-Acquired Illness or Injury  Outcome: Ongoing, Progressing  Goal: Optimal Comfort and Wellbeing  Outcome: Ongoing, Progressing  Goal: Readiness for Transition of Care  Outcome: Ongoing, Progressing     Problem: Pain Acute  Goal: Acceptable Pain Control and Functional Ability  Outcome: Ongoing, Progressing

## 2023-11-18 ENCOUNTER — ANESTHESIA EVENT (OUTPATIENT)
Dept: SURGERY | Facility: HOSPITAL | Age: 28
End: 2023-11-18
Payer: MEDICAID

## 2023-11-18 ENCOUNTER — ANESTHESIA (OUTPATIENT)
Dept: SURGERY | Facility: HOSPITAL | Age: 28
End: 2023-11-18
Payer: MEDICAID

## 2023-11-18 LAB
BASOPHILS # BLD AUTO: 0.04 X10(3)/MCL (ref 0.01–0.08)
BASOPHILS NFR BLD AUTO: 0.7 % (ref 0.1–1.2)
EOSINOPHIL # BLD AUTO: 0.21 X10(3)/MCL (ref 0.04–0.54)
EOSINOPHIL NFR BLD AUTO: 3.7 % (ref 0.7–7)
ERYTHROCYTE [DISTWIDTH] IN BLOOD BY AUTOMATED COUNT: 13.1 %
HCT VFR BLD AUTO: 40.7 % (ref 36–52)
HGB BLD-MCNC: 13.8 G/DL (ref 13–18)
IMM GRANULOCYTES # BLD AUTO: 0.03 X10(3)/MCL (ref 0–0.03)
IMM GRANULOCYTES NFR BLD AUTO: 0.5 % (ref 0–0.5)
LYMPHOCYTES # BLD AUTO: 1.96 X10(3)/MCL (ref 1.32–3.57)
LYMPHOCYTES NFR BLD AUTO: 34.4 % (ref 20–55)
MCH RBC QN AUTO: 28.2 PG (ref 27–34)
MCHC RBC AUTO-ENTMCNC: 33.9 G/DL (ref 31–37)
MCV RBC AUTO: 83.1 FL (ref 79–99)
MONOCYTES # BLD AUTO: 0.42 X10(3)/MCL (ref 0.3–0.82)
MONOCYTES NFR BLD AUTO: 7.4 % (ref 4.7–12.5)
NEUTROPHILS # BLD AUTO: 3.03 X10(3)/MCL (ref 1.78–5.38)
NEUTROPHILS NFR BLD AUTO: 53.3 % (ref 37–73)
NRBC BLD AUTO-RTO: 0 %
PLATELET # BLD AUTO: 291 X10(3)/MCL (ref 140–371)
PMV BLD AUTO: 9 FL (ref 9.4–12.4)
RBC # BLD AUTO: 4.9 X10(6)/MCL (ref 4–6)
WBC # SPEC AUTO: 5.69 X10(3)/MCL (ref 4–11.5)

## 2023-11-18 PROCEDURE — 25000003 PHARM REV CODE 250

## 2023-11-18 PROCEDURE — 25000003 PHARM REV CODE 250: Performed by: SURGERY

## 2023-11-18 PROCEDURE — 36415 COLL VENOUS BLD VENIPUNCTURE: CPT | Performed by: FAMILY MEDICINE

## 2023-11-18 PROCEDURE — 63600175 PHARM REV CODE 636 W HCPCS: Performed by: SURGERY

## 2023-11-18 PROCEDURE — 37000009 HC ANESTHESIA EA ADD 15 MINS: Performed by: SURGERY

## 2023-11-18 PROCEDURE — 71000033 HC RECOVERY, INTIAL HOUR: Performed by: SURGERY

## 2023-11-18 PROCEDURE — 85025 COMPLETE CBC W/AUTO DIFF WBC: CPT | Performed by: FAMILY MEDICINE

## 2023-11-18 PROCEDURE — 96376 TX/PRO/DX INJ SAME DRUG ADON: CPT | Mod: 59

## 2023-11-18 PROCEDURE — 63600175 PHARM REV CODE 636 W HCPCS: Performed by: FAMILY MEDICINE

## 2023-11-18 PROCEDURE — 37000008 HC ANESTHESIA 1ST 15 MINUTES: Performed by: SURGERY

## 2023-11-18 PROCEDURE — 96361 HYDRATE IV INFUSION ADD-ON: CPT | Mod: 59

## 2023-11-18 PROCEDURE — 63600175 PHARM REV CODE 636 W HCPCS

## 2023-11-18 PROCEDURE — 25500020 PHARM REV CODE 255: Performed by: FAMILY MEDICINE

## 2023-11-18 PROCEDURE — D9220A PRA ANESTHESIA: Mod: ,,, | Performed by: NURSE ANESTHETIST, CERTIFIED REGISTERED

## 2023-11-18 PROCEDURE — 94761 N-INVAS EAR/PLS OXIMETRY MLT: CPT

## 2023-11-18 PROCEDURE — D9220A PRA ANESTHESIA: ICD-10-PCS | Mod: ,,, | Performed by: NURSE ANESTHETIST, CERTIFIED REGISTERED

## 2023-11-18 PROCEDURE — 96366 THER/PROPH/DIAG IV INF ADDON: CPT

## 2023-11-18 PROCEDURE — A9698 NON-RAD CONTRAST MATERIALNOC: HCPCS | Performed by: FAMILY MEDICINE

## 2023-11-18 PROCEDURE — 36000709 HC OR TIME LEV III EA ADD 15 MIN: Performed by: SURGERY

## 2023-11-18 PROCEDURE — 25000003 PHARM REV CODE 250: Performed by: NURSE ANESTHETIST, CERTIFIED REGISTERED

## 2023-11-18 PROCEDURE — G0378 HOSPITAL OBSERVATION PER HR: HCPCS

## 2023-11-18 PROCEDURE — 96375 TX/PRO/DX INJ NEW DRUG ADDON: CPT

## 2023-11-18 PROCEDURE — 27201423 OPTIME MED/SURG SUP & DEVICES STERILE SUPPLY: Performed by: SURGERY

## 2023-11-18 PROCEDURE — S5010 5% DEXTROSE AND 0.45% SALINE: HCPCS

## 2023-11-18 PROCEDURE — 36000708 HC OR TIME LEV III 1ST 15 MIN: Performed by: SURGERY

## 2023-11-18 PROCEDURE — 63600175 PHARM REV CODE 636 W HCPCS: Performed by: NURSE ANESTHETIST, CERTIFIED REGISTERED

## 2023-11-18 PROCEDURE — 25500020 PHARM REV CODE 255: Performed by: SURGERY

## 2023-11-18 RX ORDER — MIDAZOLAM HYDROCHLORIDE 1 MG/ML
2 INJECTION INTRAMUSCULAR; INTRAVENOUS
Status: COMPLETED | OUTPATIENT
Start: 2023-11-18 | End: 2023-11-18

## 2023-11-18 RX ORDER — FENTANYL CITRATE 50 UG/ML
INJECTION, SOLUTION INTRAMUSCULAR; INTRAVENOUS
Status: DISCONTINUED | OUTPATIENT
Start: 2023-11-18 | End: 2023-11-18

## 2023-11-18 RX ORDER — HYDRALAZINE HYDROCHLORIDE 20 MG/ML
10 INJECTION INTRAMUSCULAR; INTRAVENOUS EVERY 4 HOURS PRN
Status: DISCONTINUED | OUTPATIENT
Start: 2023-11-18 | End: 2023-11-19 | Stop reason: HOSPADM

## 2023-11-18 RX ORDER — LIDOCAINE HYDROCHLORIDE 10 MG/ML
INJECTION INFILTRATION; PERINEURAL
Status: DISCONTINUED | OUTPATIENT
Start: 2023-11-18 | End: 2023-11-18 | Stop reason: HOSPADM

## 2023-11-18 RX ORDER — BUPIVACAINE HYDROCHLORIDE 5 MG/ML
INJECTION, SOLUTION EPIDURAL; INTRACAUDAL
Status: DISCONTINUED | OUTPATIENT
Start: 2023-11-18 | End: 2023-11-18 | Stop reason: HOSPADM

## 2023-11-18 RX ORDER — FAMOTIDINE 20 MG/1
20 TABLET, FILM COATED ORAL
Status: DISCONTINUED | OUTPATIENT
Start: 2023-11-18 | End: 2023-11-19 | Stop reason: HOSPADM

## 2023-11-18 RX ORDER — LABETALOL HCL 20 MG/4 ML
20 SYRINGE (ML) INTRAVENOUS ONCE
Status: COMPLETED | OUTPATIENT
Start: 2023-11-18 | End: 2023-11-18

## 2023-11-18 RX ORDER — ONDANSETRON 2 MG/ML
INJECTION INTRAMUSCULAR; INTRAVENOUS
Status: DISCONTINUED | OUTPATIENT
Start: 2023-11-18 | End: 2023-11-18

## 2023-11-18 RX ORDER — CLONIDINE HYDROCHLORIDE 0.1 MG/1
0.2 TABLET ORAL EVERY 6 HOURS PRN
Status: DISCONTINUED | OUTPATIENT
Start: 2023-11-18 | End: 2023-11-19 | Stop reason: HOSPADM

## 2023-11-18 RX ORDER — LIDOCAINE HYDROCHLORIDE 20 MG/ML
INJECTION INTRAVENOUS
Status: DISCONTINUED | OUTPATIENT
Start: 2023-11-18 | End: 2023-11-18

## 2023-11-18 RX ORDER — PROPOFOL 10 MG/ML
VIAL (ML) INTRAVENOUS
Status: DISCONTINUED | OUTPATIENT
Start: 2023-11-18 | End: 2023-11-18

## 2023-11-18 RX ORDER — GLYCOPYRROLATE 0.2 MG/ML
0.2 INJECTION INTRAMUSCULAR; INTRAVENOUS
Status: COMPLETED | OUTPATIENT
Start: 2023-11-18 | End: 2023-11-18

## 2023-11-18 RX ORDER — VECURONIUM BROMIDE FOR INJECTION 1 MG/ML
INJECTION, POWDER, LYOPHILIZED, FOR SOLUTION INTRAVENOUS
Status: DISCONTINUED | OUTPATIENT
Start: 2023-11-18 | End: 2023-11-18

## 2023-11-18 RX ADMIN — ONDANSETRON 4 MG: 2 INJECTION INTRAMUSCULAR; INTRAVENOUS at 01:11

## 2023-11-18 RX ADMIN — IOHEXOL 1000 ML: 9 SOLUTION ORAL at 06:11

## 2023-11-18 RX ADMIN — CITALOPRAM HYDROBROMIDE 20 MG: 20 TABLET ORAL at 08:11

## 2023-11-18 RX ADMIN — FENTANYL CITRATE 50 MCG: 50 INJECTION, SOLUTION INTRAMUSCULAR; INTRAVENOUS at 02:11

## 2023-11-18 RX ADMIN — GLYCOPYRROLATE 0.2 MG: 0.2 INJECTION INTRAMUSCULAR; INTRAVENOUS at 01:11

## 2023-11-18 RX ADMIN — LABETALOL HYDROCHLORIDE 10 MG: 5 INJECTION, SOLUTION INTRAVENOUS at 02:11

## 2023-11-18 RX ADMIN — PROPOFOL 140 MG: 10 INJECTION, EMULSION INTRAVENOUS at 01:11

## 2023-11-18 RX ADMIN — PIPERACILLIN AND TAZOBACTAM 4.5 G: 4; .5 INJECTION, POWDER, LYOPHILIZED, FOR SOLUTION INTRAVENOUS; PARENTERAL at 11:11

## 2023-11-18 RX ADMIN — SUGAMMADEX 200 MG: 100 INJECTION, SOLUTION INTRAVENOUS at 02:11

## 2023-11-18 RX ADMIN — DEXTROSE AND SODIUM CHLORIDE: 5; 450 INJECTION, SOLUTION INTRAVENOUS at 11:11

## 2023-11-18 RX ADMIN — FAMOTIDINE 20 MG: 10 INJECTION, SOLUTION INTRAVENOUS at 08:11

## 2023-11-18 RX ADMIN — MIDAZOLAM HYDROCHLORIDE 2 MG: 1 INJECTION, SOLUTION INTRAMUSCULAR; INTRAVENOUS at 01:11

## 2023-11-18 RX ADMIN — KETOROLAC TROMETHAMINE 30 MG: 30 INJECTION, SOLUTION INTRAMUSCULAR; INTRAVENOUS at 02:11

## 2023-11-18 RX ADMIN — FAMOTIDINE 20 MG: 10 INJECTION, SOLUTION INTRAVENOUS at 09:11

## 2023-11-18 RX ADMIN — DEXTROAMPHETAMINE SACCHARATE, AMPHETAMINE ASPARTATE, DEXTROAMPHETAMINE SULFATE AND AMPHETAMINE SULFATE 30 MG: 7.5; 7.5; 7.5; 7.5 TABLET ORAL at 09:11

## 2023-11-18 RX ADMIN — PIPERACILLIN AND TAZOBACTAM 4.5 G: 4; .5 INJECTION, POWDER, LYOPHILIZED, FOR SOLUTION INTRAVENOUS; PARENTERAL at 09:11

## 2023-11-18 RX ADMIN — VECURONIUM BROMIDE 2 MG: 10 INJECTION, POWDER, FOR SOLUTION INTRAVENOUS at 01:11

## 2023-11-18 RX ADMIN — IOHEXOL 30 ML: 300 INJECTION, SOLUTION INTRAVENOUS at 06:11

## 2023-11-18 RX ADMIN — VECURONIUM BROMIDE 6 MG: 10 INJECTION, POWDER, FOR SOLUTION INTRAVENOUS at 01:11

## 2023-11-18 RX ADMIN — DEXTROSE AND SODIUM CHLORIDE: 5; 450 INJECTION, SOLUTION INTRAVENOUS at 07:11

## 2023-11-18 RX ADMIN — HYDRALAZINE HYDROCHLORIDE 10 MG: 20 INJECTION INTRAMUSCULAR; INTRAVENOUS at 03:11

## 2023-11-18 RX ADMIN — PIPERACILLIN AND TAZOBACTAM 4.5 G: 4; .5 INJECTION, POWDER, LYOPHILIZED, FOR SOLUTION INTRAVENOUS; PARENTERAL at 04:11

## 2023-11-18 RX ADMIN — LIDOCAINE HYDROCHLORIDE 100 MG: 20 INJECTION, SOLUTION INTRAVENOUS at 01:11

## 2023-11-18 RX ADMIN — FENTANYL CITRATE 100 MCG: 50 INJECTION, SOLUTION INTRAMUSCULAR; INTRAVENOUS at 01:11

## 2023-11-18 NOTE — OR NURSING
Pt transferred to room 201 in stable condition report given to jeri escobar wants pts bp to be managed by dr montes nurse verbalized understanding

## 2023-11-18 NOTE — ASSESSMENT & PLAN NOTE
CT shows area of enteritis small bowel  Repeat ct done this am  Surgery following  Continue iv abx

## 2023-11-18 NOTE — ANESTHESIA PROCEDURE NOTES
Intubation    Date/Time: 11/18/2023 1:36 PM    Performed by: Manoj Loredo CRNA  Authorized by: Manoj Loredo CRNA    Intubation:     Induction:  Intravenous    Intubated:  Postinduction    Mask Ventilation:  Easy mask    Attempts:  1    Attempted By:  CRNA    Method of Intubation:  Direct    Blade:  Charles 3    Laryngeal View Grade: Grade I - full view of cords      Difficult Airway Encountered?: No      Airway Device:  Oral endotracheal tube    Airway Device Size:  7.0    Style/Cuff Inflation:  Cuffed    Tube secured:  22    Secured at:  The lips    Placement Verified By:  Capnometry    Complicating Factors:  None    Findings Post-Intubation:  BS equal bilateral and atraumatic/condition of teeth unchanged

## 2023-11-18 NOTE — ANESTHESIA PREPROCEDURE EVALUATION
11/18/2023  Manny Ling is a 28 y.o., male.      Pre-op Assessment    I have reviewed the Patient Summary Reports.     I have reviewed the Nursing Notes. I have reviewed the NPO Status.   I have reviewed the Medications.     Review of Systems  Anesthesia Hx:  No problems with previous Anesthesia             Denies Family Hx of Anesthesia complications.    Denies Personal Hx of Anesthesia complications.                    Hematology/Oncology:  Hematology Normal   Oncology Normal                                   EENT/Dental:  EENT/Dental Normal           Cardiovascular:  Cardiovascular Normal Exercise tolerance: good                                           Pulmonary:  Pulmonary Normal                       Renal/:  Renal/ Normal                 Hepatic/GI:  Hepatic/GI Normal        Hepatic/GI Symptoms:           Musculoskeletal:  Musculoskeletal Normal                Neurological:  Neurology Normal                                      Endocrine:  Endocrine Normal            Dermatological:  Skin Normal    Psych:  Psychiatric Normal                    Physical Exam  General: Well nourished, Cooperative, Alert and Oriented    Airway:  Mallampati: II / II  Mouth Opening: Normal  TM Distance: Normal  Tongue: Normal  Neck ROM: Normal ROM    Dental:  Intact        Anesthesia Plan  Type of Anesthesia, risks & benefits discussed:    Anesthesia Type: Gen ETT  Intra-op Monitoring Plan: Standard ASA Monitors  Post Op Pain Control Plan: multimodal analgesia  Induction:  IV  Airway Plan: Direct  Informed Consent: Informed consent signed with the Patient and all parties understand the risks and agree with anesthesia plan.  All questions answered. Patient consented to blood products? Yes  ASA Score: 2 Emergent  Day of Surgery Review of History & Physical: H&P Update referred to the surgeon/provider.I have interviewed  and examined the patient. I have reviewed the patient's H&P dated: There are no significant changes.     Ready For Surgery From Anesthesia Perspective.     .

## 2023-11-18 NOTE — NURSING
PT. FATHER SHAVED ABDOMEN AREA, PT. TOOK SHOWER,HOSPITAL GOWN ON. PT. DENIES ANY QUESTIONS FATHER AT BEDSIDE

## 2023-11-18 NOTE — NURSING
NOTIFIED DR MEREDITH OF PT. B/P 152/105, GAVE TRANDATE 10 MG IN RECOVERY AT 1442. PT. SLEEPING DENIES ANY PAIN.

## 2023-11-18 NOTE — ANESTHESIA POSTPROCEDURE EVALUATION
Anesthesia Post Evaluation    Patient: Manny Ling    Procedure(s) Performed: Procedure(s) (LRB):  CHOLECYSTECTOMY, LAPAROSCOPIC, WITH CHOLANGIOGRAM (N/A)    Final Anesthesia Type: general      Patient location during evaluation: PACU  Patient participation: Yes- Able to Participate  Level of consciousness: awake and alert, awake and oriented  Post-procedure vital signs: reviewed and stable  Pain management: adequate  Airway patency: patent  PILO mitigation strategies: Preoperative initiation of continuous positive airway pressure (CPAP) or non-invasive positive pressure ventilation (NIPPV)  PONV status at discharge: No PONV  Anesthetic complications: no      Cardiovascular status: blood pressure returned to baseline  Respiratory status: unassisted, room air and spontaneous ventilation  Hydration status: euvolemic  Follow-up not needed.          Vitals Value Taken Time   /109 11/18/23 1434   Temp 36.2 °C (97.2 °F) 11/18/23 1425   Pulse 77 11/18/23 1434   Resp 20 11/18/23 1425   SpO2 100 % 11/18/23 1434   Vitals shown include unvalidated device data.      No case tracking events are documented in the log.      Pain/Jack Score: Pain Rating Prior to Med Admin: 10 (11/17/2023  6:50 AM)

## 2023-11-18 NOTE — DISCHARGE SUMMARY
[facility]  Discharge Note  Short Stay    Procedure(s) (LRB):  CHOLECYSTECTOMY, LAPAROSCOPIC, WITH CHOLANGIOGRAM (N/A)      OUTCOME: Patient tolerated treatment/procedure well without complication and is now ready for discharge.    DISPOSITION: Home or Self Care    FINAL DIAGNOSIS:  Right lower quadrant abdominal pain  Chronic cholecystitis with normal intraoperative cholangiography  FOLLOWUP: In clinic 1 week    DISCHARGE INSTRUCTIONS:    Discharge Procedure Orders   Diet general        TIME SPENT ON DISCHARGE:  5 minutes

## 2023-11-18 NOTE — OP NOTE
[facility]      Date of Procedure: 11/18/2023     Procedure: Procedure(s) (LRB):  CHOLECYSTECTOMY, LAPAROSCOPIC, WITH CHOLANGIOGRAM (N/A)     Surgeon(s) and Role:     * Gerry Shaw MD - Primary    Assisting Surgeon: None    Pre-Operative Diagnosis: Abdominal pain, unspecified abdominal location [R10.9]    Post-Operative Diagnosis: Post-Op Diagnosis Codes:     * Abdominal pain, unspecified abdominal location [R10.9]  Chronic cholecystitis   Normal  intraoperative cholangiography  Anesthesia: General    Operative Findings (including complications, if any):  Patient is a 28-year-old male with initial onset of abdominal pain right upper quadrant diffuse initially thought to be consistent with a small-bowel obstruction but upon further review and evaluation ultrasound was negative HIDA scan showed a 37% ejection fraction reproduced symptoms.    Patient was brought to the OR supine position general anesthetic prepped and draped in a sterile fashion had a supraumbilical incision made with social Veress needle insufflation abdomen of 14 mm of mercury with insertion of a 5 mm trocar.  Patient then underwent insertion of 2 lateral 5 mm trocars and 1 5 mm trocar just to the right of falciform ligament.  The gallbladder is grasped and mobilized cystic duct and artery were isolated triangle Calot was clearly dissected intraoperative cholangiogram was obtained.  Clips were placed on the cystic duct and artery gallbladder was cauterized off the liver bed removed through the epigastric trocar site.  The aponeuroses of the 5 mm trocar sites were closed with 0 Vicryl on  needle.  SubQ was closed with 4-0 plain gut suture.  Dermabond was used for the skin.  Sterile dressings were applied no problems were encountered patient tolerated procedure well.        Description of Technical Procedures:  Noted above       Significant Surgical Tasks Conducted by the Assistant(s), if Applicable:   None       Estimated Blood Loss (EBL): * No values recorded between 11/18/2023  1:52 PM and 11/18/2023  2:25 PM *           Implants: * No implants in log *    Specimens:   Specimen (24h ago, onward)       Start     Ordered    11/18/23 1357  Specimen to Pathology General Surgery  Once        References:    Click here for ordering Quick Tip   Question Answer Comment   Procedure Type: General Surgery    Specimen Source Gallbladder    Specimen Class: Routine/Screening    Clinical Information: cholecystitis    Release to patient Immediate        11/18/23 1357    11/18/23 1356  Specimen to Pathology  RELEASE UPON ORDERING        References:    Click here for ordering Quick Tip   Question:  Release to patient  Answer:  Immediate    11/18/23 1356                            Condition: Good    Disposition: PACU - hemodynamically stable.        Attestation: I was present and scrubbed for the entire procedure.    Discharge Note    OUTCOME: Patient tolerated treatment/procedure well without complication and is now ready for discharge.    DISPOSITION: Home or Self Care    FINAL DIAGNOSIS:  Right lower quadrant abdominal pain  Chronic cholecystitis with normal intraoperative cholangiography  FOLLOWUP:  One-week    DISCHARGE INSTRUCTIONS:    Discharge Procedure Orders   Diet general

## 2023-11-18 NOTE — SUBJECTIVE & OBJECTIVE
Interval History:      Review of Systems   Unable to perform ROS: Other   Constitutional:  Negative for appetite change, fatigue and fever.   Respiratory:  Negative for cough, shortness of breath and wheezing.    Cardiovascular:  Negative for chest pain and leg swelling.   Gastrointestinal:  Negative for abdominal distention, abdominal pain, constipation, diarrhea, nausea and vomiting.   Skin:  Negative for color change, pallor, rash and wound.     Objective:     Vital Signs (Most Recent):  Temp: 98 °F (36.7 °C) (11/18/23 0745)  Pulse: 72 (11/18/23 0752)  Resp: 18 (11/18/23 0752)  BP: (!) 134/98 (11/18/23 0745)  SpO2: 97 % (11/18/23 0752) Vital Signs (24h Range):  Temp:  [97.3 °F (36.3 °C)-98.4 °F (36.9 °C)] 98 °F (36.7 °C)  Pulse:  [68-82] 72  Resp:  [14-20] 18  SpO2:  [96 %-99 %] 97 %  BP: (128-136)/() 134/98     Weight: 66.4 kg (146 lb 6.2 oz)  Body mass index is 22.26 kg/m².    Intake/Output Summary (Last 24 hours) at 11/18/2023 1053  Last data filed at 11/18/2023 0615  Gross per 24 hour   Intake 3308.75 ml   Output 825 ml   Net 2483.75 ml           Physical Exam  Constitutional:       General: He is not in acute distress.     Appearance: Normal appearance. He is normal weight. He is not ill-appearing.   Cardiovascular:      Rate and Rhythm: Normal rate and regular rhythm.      Pulses: Normal pulses.      Heart sounds: Normal heart sounds.   Pulmonary:      Effort: Pulmonary effort is normal.      Breath sounds: Normal breath sounds.   Abdominal:      General: Bowel sounds are normal.      Palpations: Abdomen is soft.      Tenderness: There is no abdominal tenderness (no tenderness today).   Musculoskeletal:      Right lower leg: No edema.      Left lower leg: No edema.   Skin:     General: Skin is warm and dry.      Findings: No erythema or rash.   Neurological:      General: No focal deficit present.      Mental Status: He is alert and oriented to person, place, and time.   Psychiatric:         Mood and  Affect: Mood normal.         Behavior: Behavior normal.             Significant Labs: All pertinent labs within the past 24 hours have been reviewed.  BMP:   Recent Labs   Lab 11/17/23  0436      K 4.1   CO2 27   BUN 7.0   CREATININE 0.67   CALCIUM 9.1       CBC:   Recent Labs   Lab 11/16/23  1822 11/17/23  0436 11/18/23  0412   WBC 6.31 7.74 5.69   HGB 15.1 13.8 13.8   HCT 44.2 40.0 40.7    306 291         Significant Imaging: I have reviewed all pertinent imaging results/findings within the past 24 hours.

## 2023-11-19 VITALS
TEMPERATURE: 97 F | HEART RATE: 81 BPM | WEIGHT: 146.63 LBS | RESPIRATION RATE: 20 BRPM | HEIGHT: 68 IN | SYSTOLIC BLOOD PRESSURE: 143 MMHG | BODY MASS INDEX: 22.22 KG/M2 | DIASTOLIC BLOOD PRESSURE: 82 MMHG | OXYGEN SATURATION: 98 %

## 2023-11-19 LAB
ALBUMIN SERPL-MCNC: 3.9 G/DL (ref 3.4–5)
ALBUMIN/GLOB SERPL: 1.9 RATIO
ALP SERPL-CCNC: 74 UNIT/L (ref 50–144)
ALT SERPL-CCNC: 43 UNIT/L (ref 1–45)
ANION GAP SERPL CALC-SCNC: 7 MEQ/L (ref 2–13)
AST SERPL-CCNC: 65 UNIT/L (ref 17–59)
BASOPHILS # BLD AUTO: 0.05 X10(3)/MCL (ref 0.01–0.08)
BASOPHILS NFR BLD AUTO: 0.9 % (ref 0.1–1.2)
BILIRUB SERPL-MCNC: 1.8 MG/DL (ref 0–1)
BUN SERPL-MCNC: 3 MG/DL (ref 7–20)
CALCIUM SERPL-MCNC: 8.9 MG/DL (ref 8.4–10.2)
CHLORIDE SERPL-SCNC: 103 MMOL/L (ref 98–110)
CO2 SERPL-SCNC: 27 MMOL/L (ref 21–32)
CREAT SERPL-MCNC: 0.63 MG/DL (ref 0.66–1.25)
CREAT/UREA NIT SERPL: 5 (ref 12–20)
EOSINOPHIL # BLD AUTO: 0.17 X10(3)/MCL (ref 0.04–0.54)
EOSINOPHIL NFR BLD AUTO: 3 % (ref 0.7–7)
ERYTHROCYTE [DISTWIDTH] IN BLOOD BY AUTOMATED COUNT: 13.1 %
GFR SERPLBLD CREATININE-BSD FMLA CKD-EPI: >90 MLS/MIN/1.73/M2
GLOBULIN SER-MCNC: 2.1 GM/DL (ref 2–3.9)
GLUCOSE SERPL-MCNC: 85 MG/DL (ref 70–115)
HCT VFR BLD AUTO: 39.5 % (ref 36–52)
HGB BLD-MCNC: 13.4 G/DL (ref 13–18)
IMM GRANULOCYTES # BLD AUTO: 0.02 X10(3)/MCL (ref 0–0.03)
IMM GRANULOCYTES NFR BLD AUTO: 0.3 % (ref 0–0.5)
LYMPHOCYTES # BLD AUTO: 1.83 X10(3)/MCL (ref 1.32–3.57)
LYMPHOCYTES NFR BLD AUTO: 31.8 % (ref 20–55)
MCH RBC QN AUTO: 28.1 PG (ref 27–34)
MCHC RBC AUTO-ENTMCNC: 33.9 G/DL (ref 31–37)
MCV RBC AUTO: 82.8 FL (ref 79–99)
MONOCYTES # BLD AUTO: 0.47 X10(3)/MCL (ref 0.3–0.82)
MONOCYTES NFR BLD AUTO: 8.2 % (ref 4.7–12.5)
NEUTROPHILS # BLD AUTO: 3.22 X10(3)/MCL (ref 1.78–5.38)
NEUTROPHILS NFR BLD AUTO: 55.8 % (ref 37–73)
NRBC BLD AUTO-RTO: 0 %
PLATELET # BLD AUTO: 278 X10(3)/MCL (ref 140–371)
PMV BLD AUTO: 8.9 FL (ref 9.4–12.4)
POTASSIUM SERPL-SCNC: 4 MMOL/L (ref 3.5–5.1)
PROT SERPL-MCNC: 6 GM/DL (ref 6.3–8.2)
RBC # BLD AUTO: 4.77 X10(6)/MCL (ref 4–6)
SODIUM SERPL-SCNC: 137 MMOL/L (ref 135–145)
WBC # SPEC AUTO: 5.76 X10(3)/MCL (ref 4–11.5)

## 2023-11-19 PROCEDURE — S5010 5% DEXTROSE AND 0.45% SALINE: HCPCS

## 2023-11-19 PROCEDURE — G0378 HOSPITAL OBSERVATION PER HR: HCPCS

## 2023-11-19 PROCEDURE — 36415 COLL VENOUS BLD VENIPUNCTURE: CPT | Performed by: FAMILY MEDICINE

## 2023-11-19 PROCEDURE — 63600175 PHARM REV CODE 636 W HCPCS

## 2023-11-19 PROCEDURE — 96376 TX/PRO/DX INJ SAME DRUG ADON: CPT

## 2023-11-19 PROCEDURE — 94761 N-INVAS EAR/PLS OXIMETRY MLT: CPT

## 2023-11-19 PROCEDURE — 96366 THER/PROPH/DIAG IV INF ADDON: CPT

## 2023-11-19 PROCEDURE — 25000003 PHARM REV CODE 250

## 2023-11-19 PROCEDURE — 80053 COMPREHEN METABOLIC PANEL: CPT | Performed by: FAMILY MEDICINE

## 2023-11-19 PROCEDURE — 85025 COMPLETE CBC W/AUTO DIFF WBC: CPT | Performed by: FAMILY MEDICINE

## 2023-11-19 PROCEDURE — 96361 HYDRATE IV INFUSION ADD-ON: CPT

## 2023-11-19 RX ORDER — HYDROCODONE BITARTRATE AND ACETAMINOPHEN 5; 325 MG/1; MG/1
1 TABLET ORAL EVERY 6 HOURS PRN
Qty: 10 TABLET | Refills: 0 | Status: SHIPPED | OUTPATIENT
Start: 2023-11-19 | End: 2023-11-29

## 2023-11-19 RX ORDER — SODIUM CHLORIDE 9 MG/ML
INJECTION, SOLUTION INTRAVENOUS CONTINUOUS
Status: DISCONTINUED | OUTPATIENT
Start: 2023-11-19 | End: 2023-11-19 | Stop reason: HOSPADM

## 2023-11-19 RX ORDER — HYDROMORPHONE HYDROCHLORIDE 1 MG/ML
0.5 INJECTION, SOLUTION INTRAMUSCULAR; INTRAVENOUS; SUBCUTANEOUS EVERY 6 HOURS PRN
Status: DISCONTINUED | OUTPATIENT
Start: 2023-11-19 | End: 2023-11-19 | Stop reason: HOSPADM

## 2023-11-19 RX ORDER — ONDANSETRON 2 MG/ML
4 INJECTION INTRAMUSCULAR; INTRAVENOUS EVERY 12 HOURS PRN
Status: DISCONTINUED | OUTPATIENT
Start: 2023-11-19 | End: 2023-11-19 | Stop reason: HOSPADM

## 2023-11-19 RX ADMIN — DEXTROAMPHETAMINE SACCHARATE, AMPHETAMINE ASPARTATE, DEXTROAMPHETAMINE SULFATE AND AMPHETAMINE SULFATE 30 MG: 7.5; 7.5; 7.5; 7.5 TABLET ORAL at 09:11

## 2023-11-19 RX ADMIN — FAMOTIDINE 20 MG: 10 INJECTION, SOLUTION INTRAVENOUS at 08:11

## 2023-11-19 RX ADMIN — DEXTROSE AND SODIUM CHLORIDE: 5; 450 INJECTION, SOLUTION INTRAVENOUS at 06:11

## 2023-11-19 RX ADMIN — CITALOPRAM HYDROBROMIDE 20 MG: 20 TABLET ORAL at 08:11

## 2023-11-19 RX ADMIN — PIPERACILLIN AND TAZOBACTAM 4.5 G: 4; .5 INJECTION, POWDER, LYOPHILIZED, FOR SOLUTION INTRAVENOUS; PARENTERAL at 03:11

## 2023-11-19 NOTE — NURSING
DISCHARGED INSTRUCTIONS GIVEN TO PT. AND FATHER UNDERSTOOD. QUESTIONS ASKED AND ANSWERED. PT. LEFT VIA W/C IN STABLE COND.

## 2023-11-19 NOTE — DISCHARGE INSTRUCTIONS
NO HEAVY LIFTING. DO NOT TAKE BATHS. AVOID GREASY FOODS. WASH INCISIONS WITH SOAP AND WATER PAT DRY, DO NOT PICK AT GLUE, LET IT COME OFF ON ITS ON.AMBULATE COUPLE TIMES A DAY.    MONITORING BLOOD PRESSURE  TWICE A DAY KEEP RECORD OF RESULTS AND BRING TO DOCTORS APPOINTMENT.

## 2023-11-19 NOTE — DISCHARGE SUMMARY
[facility]/Surg  [facility] Medicine  Discharge Summary      Patient Name: Manny Ling  MRN: [id number]  [facility]: [id number]  Patient Class: OP- Observation  Admission Date: 11/16/2023  Hospital Length of Stay: 0 days  Discharge Date and Time:  11/19/2023 10:54 AM  Attending Physician: Jay Howard MD   Discharging Provider: Maria Isabel Agarwal MD  Primary Care Provider: No primary care provider on file.    Primary Care Team: Networked reference to record PCT     HPI:    Abdominal Pain       RLQ abd/periumbilical pain. That started today. With nausea. Last BM today PTA. reported to be normal. Denies Fever, Dysuria.          HPI: The patient is a 28-year-old man with no significant past medical history. He presents with a magalie pain present for the last one to two days. Very little history is available from the patient due to history of autism. The patient's cat scan performed in the emergency department is significant for right lower quadrant inflammatory process possibly. The appendix is not well visualized soul appendicitis remains on the differential. General surgery was consult it for evaluation and they will see the patient in the morning. Currently he is not complaining of any abdominal pain and his exam is not remarkable.     No past medical history on file.     No past surgical history on file.     Review of patient's allergies indicates:  No Known Allergies     No current facility-administered medications on file prior to encounter.       Procedure(s) (LRB):  CHOLECYSTECTOMY, LAPAROSCOPIC, WITH CHOLANGIOGRAM (N/A)      Hospital Course:   11/17/2023 pt 29 yo with h/o autism presented with RLQ and belem-umbilical pain.  Surgery consulted.    11/18/2023 pain is better today.  CT of abd/pel pending this am  11/19/2023 s/p lap jeaneth doing well post op and ready for dc .  BP was elevated immediately post op is 130/80 this am.  Discussed with pt father will monitor BP at home and bring record of BP to  f/u appt with PCP ini 1 week.  Pt will f/u with surgery in 1-2 weeks and continue full liquid diet advance slowly to low cholesterol/low fat per surgery f/u recommendations.     Goals of Care Treatment Preferences:  Code Status: Full Code      Consults:   Consults (From admission, onward)          Status Ordering Provider     Inpatient consult to General Surgery  Once        Provider:  Gerry Shaw MD    Acknowledged JAELYN CONRAD     Inpatient virtual consult to Hospital Medicine  Once        Provider:  Josue Lozoya MD    Acknowledged JAELYN CONRAD            No new Assessment & Plan notes have been filed under this hospital service since the last note was generated.  Service: Hospital Medicine    Final Active Diagnoses:    Diagnosis Date Noted POA    PRINCIPAL PROBLEM:  Right lower quadrant abdominal pain [R10.31] 11/16/2023 Yes    Enteritis [K52.9] 11/16/2023 Yes      Problems Resolved During this Admission:       Discharged Condition: good    Disposition: Home or Self Care    Follow Up:   Follow-up Information       Gerry Shaw MD Follow up.    Specialties: General Surgery, Cardiology  Contact information:  [address]  [address]  [place] [id number] [phone]               lakhwinder suarez Follow up in 1 week(s).    Why: monitor bp and f/u with PCP 1 wk                         Patient Instructions:      Diet general     Activity as tolerated       Significant Diagnostic Studies: Labs: BMP:   Recent Labs   Lab 11/19/23  0410      K 4.0   CO2 27   BUN 3.0*   CREATININE 0.63*   CALCIUM 8.9    and CMP   Recent Labs   Lab 11/19/23  0410      K 4.0   CO2 27   BUN 3.0*   CREATININE 0.63*   CALCIUM 8.9   ALBUMIN 3.9   BILITOT 1.8*   ALKPHOS 74   AST 65*   ALT 43       Pending Diagnostic Studies:       Procedure Component Value Units Date/Time    Gastrointestinal Pathogens Panel, PCR [[id number]]     Order Status: Sent Lab Status: No result     Specimen: Stool     Specimen to  Pathology [[id number]] Collected: 11/18/23 1347    Order Status: Sent Lab Status: In process Updated: 11/18/23 7975    Specimen: Tissue from Gallbladder     Specimen to Pathology General Surgery [[id number]]     Order Status: Sent Lab Status: No result     Specimen: Tissue            Medications:  Reconciled Home Medications:      Medication List        START taking these medications      HYDROcodone-acetaminophen 5-325 mg per tablet  Commonly known as: NORCO  Take 1 tablet by mouth every 6 (six) hours as needed for Pain.            CONTINUE taking these medications      AdderalL 30 mg Tab  Generic drug: dextroamphetamine-amphetamine  Take 1 tablet by mouth 2 (two) times daily.     CeleXA 20 MG tablet  Generic drug: citalopram  Take 20 mg by mouth.              Indwelling Lines/Drains at time of discharge:   Lines/Drains/Airways       None                   Time spent on the discharge of patient: 37 minutes     Physical Exam  Vitals and nursing note reviewed.   Constitutional:       General: He is not in acute distress.     Appearance: Normal appearance. He is normal weight. He is not ill-appearing.   HENT:      Head: Normocephalic and atraumatic.   Cardiovascular:      Rate and Rhythm: Normal rate and regular rhythm.      Pulses: Normal pulses.      Heart sounds: Normal heart sounds.   Pulmonary:      Effort: Pulmonary effort is normal.      Breath sounds: Normal breath sounds.   Abdominal:      General: Bowel sounds are normal.      Palpations: Abdomen is soft.      Tenderness: There is no abdominal tenderness. There is no guarding.      Comments: Surgical trochar sites clean and dry with no bleeding or drainage   Musculoskeletal:      Right lower leg: No edema.      Left lower leg: No edema.   Skin:     General: Skin is warm and dry.      Findings: No erythema or rash.   Neurological:      Mental Status: He is alert. Mental status is at baseline.   Psychiatric:         Mood and Affect: Mood normal.          Behavior: Behavior normal.      Comments: I had a face to face encounter with this patient prior to discharging           Maria Isabel Agarwal MD  Department of Hospital Medicine  [facility]/Surg

## 2023-11-20 NOTE — INTERVAL H&P NOTE
The patient has been examined and the H&P has been reviewed:    I concur with the findings and no changes have occurred since H&P was written.    Surgery risks, benefits and alternative options discussed and understood by patient/family.          Active Hospital Problems    Diagnosis  POA    *Right lower quadrant abdominal pain [R10.31]  Yes    Enteritis [K52.9]  Yes      Resolved Hospital Problems   No resolved problems to display.

## 2025-02-26 NOTE — PROGRESS NOTES
[facility]/Surg  [facility] Medicine  Progress Note    Patient Name: Manny Ling  MRN: [id number]  Patient Class: OP- Observation   Admission Date: 11/16/2023  Length of Stay: 0 days  Attending Physician: Jay Howard MD  Primary Care Provider: No primary care provider on file.        Subjective:     Principal Problem:Right lower quadrant abdominal pain        HPI:   Abdominal Pain       RLQ abd/periumbilical pain. That started today. With nausea. Last BM today PTA. reported to be normal. Denies Fever, Dysuria.          HPI: The patient is a 28-year-old man with no significant past medical history. He presents with a magalie pain present for the last one to two days. Very little history is available from the patient due to history of autism. The patient's cat scan performed in the emergency department is significant for right lower quadrant inflammatory process possibly. The appendix is not well visualized soul appendicitis remains on the differential. General surgery was consult it for evaluation and they will see the patient in the morning. Currently he is not complaining of any abdominal pain and his exam is not remarkable.     No past medical history on file.     No past surgical history on file.     Review of patient's allergies indicates:  No Known Allergies     No current facility-administered medications on file prior to encounter.       Overview/Hospital Course:  11/17/2023 pt 27 yo with h/o autism presented with RLQ and belem-umbilical pain.  Surgery consulted.    11/18/2023 pain is better today.  CT of abd/pel pending this am    Interval History:      Review of Systems   Unable to perform ROS: Other   Constitutional:  Negative for appetite change, fatigue and fever.   Respiratory:  Negative for cough, shortness of breath and wheezing.    Cardiovascular:  Negative for chest pain and leg swelling.   Gastrointestinal:  Negative for abdominal distention, abdominal pain, constipation,  Writer spoke with patient regarding the fax received from the company the caller states he is from.  The patient never contacted the company like the fax states.  Writer contacted the number given and the person that answered said \"this is [place]\".  Writer determined this is a scam.      Patient was told to be aware of scams.    diarrhea, nausea and vomiting.   Skin:  Negative for color change, pallor, rash and wound.     Objective:     Vital Signs (Most Recent):  Temp: 98 °F (36.7 °C) (11/18/23 0745)  Pulse: 72 (11/18/23 0752)  Resp: 18 (11/18/23 0752)  BP: (!) 134/98 (11/18/23 0745)  SpO2: 97 % (11/18/23 0752) Vital Signs (24h Range):  Temp:  [97.3 °F (36.3 °C)-98.4 °F (36.9 °C)] 98 °F (36.7 °C)  Pulse:  [68-82] 72  Resp:  [14-20] 18  SpO2:  [96 %-99 %] 97 %  BP: (128-136)/() 134/98     Weight: 66.4 kg (146 lb 6.2 oz)  Body mass index is 22.26 kg/m².    Intake/Output Summary (Last 24 hours) at 11/18/2023 1053  Last data filed at 11/18/2023 0615  Gross per 24 hour   Intake 3308.75 ml   Output 825 ml   Net 2483.75 ml           Physical Exam  Constitutional:       General: He is not in acute distress.     Appearance: Normal appearance. He is normal weight. He is not ill-appearing.   Cardiovascular:      Rate and Rhythm: Normal rate and regular rhythm.      Pulses: Normal pulses.      Heart sounds: Normal heart sounds.   Pulmonary:      Effort: Pulmonary effort is normal.      Breath sounds: Normal breath sounds.   Abdominal:      General: Bowel sounds are normal.      Palpations: Abdomen is soft.      Tenderness: There is no abdominal tenderness (no tenderness today).   Musculoskeletal:      Right lower leg: No edema.      Left lower leg: No edema.   Skin:     General: Skin is warm and dry.      Findings: No erythema or rash.   Neurological:      General: No focal deficit present.      Mental Status: He is alert and oriented to person, place, and time.   Psychiatric:         Mood and Affect: Mood normal.         Behavior: Behavior normal.             Significant Labs: All pertinent labs within the past 24 hours have been reviewed.  BMP:   Recent Labs   Lab 11/17/23  0436      K 4.1   CO2 27   BUN 7.0   CREATININE 0.67   CALCIUM 9.1       CBC:   Recent Labs   Lab 11/16/23  1822 11/17/23  0436 11/18/23  0412   WBC 6.31 7.74 5.69    HGB 15.1 13.8 13.8   HCT 44.2 40.0 40.7    306 291         Significant Imaging: I have reviewed all pertinent imaging results/findings within the past 24 hours.    Assessment/Plan:      * Right lower quadrant abdominal pain  Rule out appendicitis, no clear evidence on CT  Surgery constulted  Will continue iv abx      Enteritis  CT shows area of enteritis small bowel  Repeat ct done this am  Surgery following  Continue iv abx        VTE Risk Mitigation (From admission, onward)           Ordered     IP VTE LOW RISK PATIENT  Once         11/16/23 2109     Place sequential compression device  Until discontinued         11/16/23 2109                    Discharge Planning   DELORES: 11/20/2023     Code Status: Full Code   Is the patient medically ready for discharge?:     Reason for patient still in hospital (select all that apply): Patient trending condition, Treatment, and Consult recommendations  Discharge Plan A: Home with family                  Maria Isabel Agarwal MD  Department of Hospital Medicine   [facility]

## (undated) DEVICE — IRRIGATOR ENDOSCOPY DISP.

## (undated) DEVICE — APPLIER CLIP EPIX UNIV 5X34

## (undated) DEVICE — TROCAR KII SHLD BLDED 5X100MM

## (undated) DEVICE — NDL PNEUMO INSUFFLATI 120MM

## (undated) DEVICE — NDL SAFETY 22G X 1.5 ECLIPSE

## (undated) DEVICE — SYR 10CC LUER LOCK

## (undated) DEVICE — ADHESIVE DERMABOND ADVANCED

## (undated) DEVICE — SUT 0 VICRYL / UR6 (J603)

## (undated) DEVICE — SYR LUER LOCK 20ML

## (undated) DEVICE — ELECTRODE MEGADYNE L-WIRE 33CM

## (undated) DEVICE — GLOVE PROTEXIS HYDROGEL SZ6.5

## (undated) DEVICE — Device

## (undated) DEVICE — SET TUB INSUFFLATION SUC 20L

## (undated) DEVICE — DRAPE INCISE IOBAN 2 23X17IN

## (undated) DEVICE — NDL INSUFFLATION VERRES 120MM

## (undated) DEVICE — SLEEVE KII ADV FIX 5X100MM

## (undated) DEVICE — CATH CHOLANGIOGRAM 13IN

## (undated) DEVICE — DISSECTOR EPIX LAPA 5MMX35CM

## (undated) DEVICE — FILTER LAPAROSCOPIC SMOKE EVAC

## (undated) DEVICE — BAG SPEC RETRV ENDO 4X6IN DISP

## (undated) DEVICE — SCISSOR 5MMX35CM DIRECT DRIVE

## (undated) DEVICE — HOLDER SCALPEL SURGICAL GOLD

## (undated) DEVICE — DRAPE C ARM 42 X 120 10/BX

## (undated) DEVICE — DRAPE DEVON INSTRUMENT 10X16IN

## (undated) DEVICE — IRRIGATOR HYDRO-SURG PLUS 5MM

## (undated) DEVICE — SUT GUT PL. 4-0 27 FS-2